# Patient Record
Sex: FEMALE | Race: WHITE | NOT HISPANIC OR LATINO | URBAN - METROPOLITAN AREA
[De-identification: names, ages, dates, MRNs, and addresses within clinical notes are randomized per-mention and may not be internally consistent; named-entity substitution may affect disease eponyms.]

---

## 2021-04-01 ENCOUNTER — INPATIENT (INPATIENT)
Facility: HOSPITAL | Age: 36
LOS: 7 days | Discharge: HOME | End: 2021-04-09
Attending: STUDENT IN AN ORGANIZED HEALTH CARE EDUCATION/TRAINING PROGRAM | Admitting: STUDENT IN AN ORGANIZED HEALTH CARE EDUCATION/TRAINING PROGRAM
Payer: COMMERCIAL

## 2021-04-01 VITALS
RESPIRATION RATE: 20 BRPM | TEMPERATURE: 100 F | SYSTOLIC BLOOD PRESSURE: 129 MMHG | HEART RATE: 135 BPM | OXYGEN SATURATION: 92 % | DIASTOLIC BLOOD PRESSURE: 94 MMHG

## 2021-04-01 LAB
ANION GAP SERPL CALC-SCNC: 19 MMOL/L — HIGH (ref 7–14)
B-OH-BUTYR SERPL-SCNC: 3.2 MMOL/L — HIGH
BASE EXCESS BLDV CALC-SCNC: -5.7 MMOL/L — LOW (ref -2–2)
BASOPHILS # BLD AUTO: 0.01 K/UL — SIGNIFICANT CHANGE UP (ref 0–0.2)
BASOPHILS NFR BLD AUTO: 0.2 % — SIGNIFICANT CHANGE UP (ref 0–1)
BUN SERPL-MCNC: 8 MG/DL — LOW (ref 10–20)
CA-I SERPL-SCNC: SIGNIFICANT CHANGE UP MMOL/L (ref 1.12–1.3)
CALCIUM SERPL-MCNC: 8.8 MG/DL — SIGNIFICANT CHANGE UP (ref 8.5–10.1)
CHLORIDE SERPL-SCNC: 100 MMOL/L — SIGNIFICANT CHANGE UP (ref 98–110)
CO2 SERPL-SCNC: 17 MMOL/L — SIGNIFICANT CHANGE UP (ref 17–32)
CREAT SERPL-MCNC: 0.8 MG/DL — SIGNIFICANT CHANGE UP (ref 0.7–1.5)
D DIMER BLD IA.RAPID-MCNC: 173 NG/ML DDU — SIGNIFICANT CHANGE UP (ref 0–230)
EOSINOPHIL # BLD AUTO: 0 K/UL — SIGNIFICANT CHANGE UP (ref 0–0.7)
EOSINOPHIL NFR BLD AUTO: 0 % — SIGNIFICANT CHANGE UP (ref 0–8)
GAS PNL BLDV: SIGNIFICANT CHANGE UP
GAS PNL BLDV: SIGNIFICANT CHANGE UP
GAS PNL BLDV: SIGNIFICANT CHANGE UP MMOL/L (ref 136–145)
GLUCOSE BLDC GLUCOMTR-MCNC: 257 MG/DL — HIGH (ref 70–99)
GLUCOSE BLDC GLUCOMTR-MCNC: 260 MG/DL — HIGH (ref 70–99)
GLUCOSE SERPL-MCNC: 328 MG/DL — HIGH (ref 70–99)
HCG SERPL QL: NEGATIVE — SIGNIFICANT CHANGE UP
HCO3 BLDV-SCNC: 18 MMOL/L — LOW (ref 22–29)
HCT VFR BLD CALC: 53.3 % — HIGH (ref 37–47)
HCT VFR BLDA CALC: 47 % — HIGH (ref 34–44)
HGB BLD CALC-MCNC: 15.3 G/DL — SIGNIFICANT CHANGE UP (ref 14–18)
HGB BLD-MCNC: 17 G/DL — HIGH (ref 12–16)
IMM GRANULOCYTES NFR BLD AUTO: 0.5 % — HIGH (ref 0.1–0.3)
LACTATE BLDV-MCNC: SIGNIFICANT CHANGE UP MMOL/L (ref 0.5–1.6)
LACTATE SERPL-SCNC: 2.2 MMOL/L — HIGH (ref 0.7–2)
LYMPHOCYTES # BLD AUTO: 0.86 K/UL — LOW (ref 1.2–3.4)
LYMPHOCYTES # BLD AUTO: 13.2 % — LOW (ref 20.5–51.1)
MCHC RBC-ENTMCNC: 24 PG — LOW (ref 27–31)
MCHC RBC-ENTMCNC: 31.9 G/DL — LOW (ref 32–37)
MCV RBC AUTO: 75.4 FL — LOW (ref 81–99)
MONOCYTES # BLD AUTO: 0.33 K/UL — SIGNIFICANT CHANGE UP (ref 0.1–0.6)
MONOCYTES NFR BLD AUTO: 5.1 % — SIGNIFICANT CHANGE UP (ref 1.7–9.3)
NEUTROPHILS # BLD AUTO: 5.3 K/UL — SIGNIFICANT CHANGE UP (ref 1.4–6.5)
NEUTROPHILS NFR BLD AUTO: 81 % — HIGH (ref 42.2–75.2)
NRBC # BLD: 0 /100 WBCS — SIGNIFICANT CHANGE UP (ref 0–0)
PCO2 BLDV: 28 MMHG — LOW (ref 39–42)
PH BLDV: 7.4 — SIGNIFICANT CHANGE UP (ref 7.26–7.43)
PLATELET # BLD AUTO: 241 K/UL — SIGNIFICANT CHANGE UP (ref 130–400)
PO2 BLDV: 40 MMHG — SIGNIFICANT CHANGE UP (ref 20–40)
POTASSIUM BLDV-SCNC: 3.5 MMOL/L — SIGNIFICANT CHANGE UP (ref 3.3–5.6)
POTASSIUM SERPL-MCNC: 4.4 MMOL/L — SIGNIFICANT CHANGE UP (ref 3.5–5)
POTASSIUM SERPL-SCNC: 4.4 MMOL/L — SIGNIFICANT CHANGE UP (ref 3.5–5)
RBC # BLD: 7.07 M/UL — HIGH (ref 4.2–5.4)
RBC # FLD: 14.5 % — SIGNIFICANT CHANGE UP (ref 11.5–14.5)
SAO2 % BLDV: 77 % — SIGNIFICANT CHANGE UP
SARS-COV-2 RNA SPEC QL NAA+PROBE: SIGNIFICANT CHANGE UP
SODIUM SERPL-SCNC: 136 MMOL/L — SIGNIFICANT CHANGE UP (ref 135–146)
WBC # BLD: 6.53 K/UL — SIGNIFICANT CHANGE UP (ref 4.8–10.8)
WBC # FLD AUTO: 6.53 K/UL — SIGNIFICANT CHANGE UP (ref 4.8–10.8)

## 2021-04-01 PROCEDURE — 99291 CRITICAL CARE FIRST HOUR: CPT

## 2021-04-01 PROCEDURE — 71045 X-RAY EXAM CHEST 1 VIEW: CPT | Mod: 26

## 2021-04-01 PROCEDURE — 93010 ELECTROCARDIOGRAM REPORT: CPT

## 2021-04-01 RX ORDER — SODIUM CHLORIDE 9 MG/ML
1000 INJECTION, SOLUTION INTRAVENOUS
Refills: 0 | Status: DISCONTINUED | OUTPATIENT
Start: 2021-04-01 | End: 2021-04-02

## 2021-04-01 RX ORDER — CEFTRIAXONE 500 MG/1
1000 INJECTION, POWDER, FOR SOLUTION INTRAMUSCULAR; INTRAVENOUS EVERY 24 HOURS
Refills: 0 | Status: DISCONTINUED | OUTPATIENT
Start: 2021-04-01 | End: 2021-04-02

## 2021-04-01 RX ORDER — KETOROLAC TROMETHAMINE 30 MG/ML
15 SYRINGE (ML) INJECTION ONCE
Refills: 0 | Status: DISCONTINUED | OUTPATIENT
Start: 2021-04-01 | End: 2021-04-01

## 2021-04-01 RX ORDER — AZITHROMYCIN 500 MG/1
500 TABLET, FILM COATED ORAL EVERY 24 HOURS
Refills: 0 | Status: DISCONTINUED | OUTPATIENT
Start: 2021-04-01 | End: 2021-04-02

## 2021-04-01 RX ORDER — AZITHROMYCIN 500 MG/1
500 TABLET, FILM COATED ORAL ONCE
Refills: 0 | Status: COMPLETED | OUTPATIENT
Start: 2021-04-01 | End: 2021-04-01

## 2021-04-01 RX ORDER — ONDANSETRON 8 MG/1
4 TABLET, FILM COATED ORAL ONCE
Refills: 0 | Status: COMPLETED | OUTPATIENT
Start: 2021-04-01 | End: 2021-04-01

## 2021-04-01 RX ORDER — INSULIN HUMAN 100 [IU]/ML
8 INJECTION, SOLUTION SUBCUTANEOUS
Qty: 100 | Refills: 0 | Status: DISCONTINUED | OUTPATIENT
Start: 2021-04-01 | End: 2021-04-03

## 2021-04-01 RX ORDER — SODIUM CHLORIDE 9 MG/ML
1000 INJECTION, SOLUTION INTRAVENOUS ONCE
Refills: 0 | Status: COMPLETED | OUTPATIENT
Start: 2021-04-01 | End: 2021-04-01

## 2021-04-01 RX ORDER — CEFTRIAXONE 500 MG/1
1000 INJECTION, POWDER, FOR SOLUTION INTRAMUSCULAR; INTRAVENOUS ONCE
Refills: 0 | Status: COMPLETED | OUTPATIENT
Start: 2021-04-01 | End: 2021-04-01

## 2021-04-01 RX ORDER — ACETAMINOPHEN 500 MG
975 TABLET ORAL ONCE
Refills: 0 | Status: COMPLETED | OUTPATIENT
Start: 2021-04-01 | End: 2021-04-01

## 2021-04-01 RX ADMIN — CEFTRIAXONE 100 MILLIGRAM(S): 500 INJECTION, POWDER, FOR SOLUTION INTRAMUSCULAR; INTRAVENOUS at 21:04

## 2021-04-01 RX ADMIN — SODIUM CHLORIDE 1000 MILLILITER(S): 9 INJECTION, SOLUTION INTRAVENOUS at 20:41

## 2021-04-01 RX ADMIN — SODIUM CHLORIDE 1000 MILLILITER(S): 9 INJECTION, SOLUTION INTRAVENOUS at 18:23

## 2021-04-01 RX ADMIN — ONDANSETRON 4 MILLIGRAM(S): 8 TABLET, FILM COATED ORAL at 20:41

## 2021-04-01 RX ADMIN — Medication 15 MILLIGRAM(S): at 19:24

## 2021-04-01 RX ADMIN — INSULIN HUMAN 8 UNIT(S)/HR: 100 INJECTION, SOLUTION SUBCUTANEOUS at 22:29

## 2021-04-01 RX ADMIN — AZITHROMYCIN 255 MILLIGRAM(S): 500 TABLET, FILM COATED ORAL at 21:04

## 2021-04-01 NOTE — ED PROVIDER NOTE - OBJECTIVE STATEMENT
pt with no pmhx presents to ED c/o cough and fever for 5 days. UC today neg rapid covid19 swab. Denies HA/dizziness/chest pain/palpitation/sob/abd pain/n/v/d/ black stool/bloody stool/urinary sxs

## 2021-04-01 NOTE — H&P ADULT - HISTORY OF PRESENT ILLNESS
34 y/o F with no PMH now presenting to ED with 4 day h/o SOB and cough. Pt went to urgent care to get rapid swab for COVID which came back negative. Now presented to ED with worsening symptoms. In the ED pt was found to have DKA along with possible PNA on Xray. Pt was given IV insulin along with antibiotics and being admitted to MICU. 36 y/o F with no PMH now presenting to ED with 4 day h/o SOB and cough. Pt went to urgent care to get rapid swab for COVID which came back negative. Now presented to ED with worsening symptoms. In the ED pt was found to have DKA along with PNA on Xray. Pt was given IV insulin along with antibiotics and called to MICU

## 2021-04-01 NOTE — ED PROVIDER NOTE - ATTENDING CONTRIBUTION TO CARE
weakness, generalized with cough for 3-4 days that is non productive, moderate. gradual. exam shows low pulse ox, no leg swelling, rhonchi in both lung fields, tachycardia. plan is to obtain labs, cxr, and provide IVF for hydration.

## 2021-04-01 NOTE — H&P ADULT - NSHPLABSRESULTS_GEN_ALL_CORE
CBC Full  -  ( 01 Apr 2021 18:39 )  WBC Count : 6.53 K/uL  RBC Count : 7.07 M/uL  Hemoglobin : 17.0 g/dL  Hematocrit : 53.3 %  Platelet Count - Automated : 241 K/uL  Mean Cell Volume : 75.4 fL  Mean Cell Hemoglobin : 24.0 pg  Mean Cell Hemoglobin Concentration : 31.9 g/dL  Auto Neutrophil # : 5.30 K/uL  Auto Lymphocyte # : 0.86 K/uL  Auto Monocyte # : 0.33 K/uL  Auto Eosinophil # : 0.00 K/uL  Auto Basophil # : 0.01 K/uL  Auto Neutrophil % : 81.0 %  Auto Lymphocyte % : 13.2 %  Auto Monocyte % : 5.1 %  Auto Eosinophil % : 0.0 %  Auto Basophil % : 0.2 %    04-01    136  |  100  |  8<L>  ----------------------------<  328<H>  4.4   |  17  |  0.8    Ca    8.8      01 Apr 2021 18:39

## 2021-04-01 NOTE — ED PROVIDER NOTE - NS ED ROS FT
Constitutional: no recent weight loss, change in appetite   Eyes: no redness/discharge/pain/vision changes  ENT: no rhinorrhea/ear pain/sore throat  Cardiac: No chest pain, SOB or edema.  Respiratory: No respiratory distress  GI: No vomiting, diarrhea or abdominal pain.  : No dysuria, frequency, urgency or hematuria  MS: no pain to back or extremities, no loss of ROM, no weakness  Neuro: No headache or weakness. No LOC.  Skin: No skin rash.  Endocrine: No history of thyroid disease or diabetes.  Except as documented in the HPI, all other systems are negative.

## 2021-04-01 NOTE — H&P ADULT - ASSESSMENT
Impression   - DKA  - suspected CAP     Plan     1.CNS no excessive sedation    2. CVS I= O    3. PULMONARY HOB 45    4. INFECTIOUS DISEASE panculture , ceftriaxone and azithro for now, f/u procal    5. GI NPO    6. RENAL and acid base f/u lytes and correct    7. Endocrine FS DKA protocol    8. Hematology f/u cbc    9. DVT AND GI PROPHYLAXIS    10. Dispo    11. Code Status MICU     Impression     - DKA  - Acute hypoxemic resp failure/  CAP  - covid neg      Plan     1.CNS: avoid CNS depressant    2. CVS I= O, CE, echo, IVF    3. PULMONARY HOB 45, keep Sao2 88 to 94%, chest ct angio    4. INFECTIOUS DISEASE panculture , ceftriaxone and levaquin,  for now, f/u procal swab nose for MRSA. F.UP CX    5. GI prophylaxis, po    6. RENAL and acid base f/u lytes and correct    7. Endocrine FS DKA protocol, insulin per pro    8. Hematology f/u cbc    9. DVT    MICU

## 2021-04-01 NOTE — ED PROVIDER NOTE - CLINICAL SUMMARY MEDICAL DECISION MAKING FREE TEXT BOX
No patient evaluated for fever and hypoxia, found to have pneumonia, started on iv abx. covid negative, patient found to have dka with elevated hydroxbutyrate and anion gap. we will treat with insulin drip and admit to icu.

## 2021-04-01 NOTE — H&P ADULT - NSICDXNOFAMILYHX_GEN_ALL_CORE
shows cardiomegaly some pulmonary congestion we admitted her to the hospital for observation given swelling at this time I do not think she has anaphylaxis otherwise stable she is protecting her airway, admitted. 12 lead EKG per my interpretation:  Junctional 63  Axis is   Left axis deviation  QTc is  403  There is no specific T wave changes appreciated. There is no specific ST wave changes appreciated. Prior EKG to compare with was not available       All diagnostic, treatment, and disposition decisions were made by myself in conjunction with the Advanced Practice Provider. For all further details of the patient's emergency department visit, please see the Advanced Practice Provider's documentation.         Gabo Denton DO  06/08/20 [id number] <-- Click to add NO pertinent Family History

## 2021-04-01 NOTE — ED PROVIDER NOTE - PHYSICAL EXAMINATION
CONSTITUTIONAL: Well-appearing; well-nourished; in no apparent distress.   CARDIOVASCULAR: Normal S1, S2; no murmurs, rubs, or gallops.   RESPIRATORY: Normal chest excursion with respiration; breath sounds clear and equal bilaterally; no wheezes, rhonchi, or rales.  GI/: Normal bowel sounds; non-distended; non-tender; no palpable organomegaly.   SKIN: Normal for age and race; warm; dry; good turgor; no apparent lesions or exudate.   NEURO/PSYCH: A & O x 4; grossly unremarkable. mood and manner are appropriate.

## 2021-04-02 LAB
A1C WITH ESTIMATED AVERAGE GLUCOSE RESULT: 11 % — HIGH (ref 4–5.6)
ALBUMIN SERPL ELPH-MCNC: 3.2 G/DL — LOW (ref 3.5–5.2)
ALBUMIN SERPL ELPH-MCNC: 3.3 G/DL — LOW (ref 3.5–5.2)
ALP SERPL-CCNC: 159 U/L — HIGH (ref 30–115)
ALP SERPL-CCNC: 161 U/L — HIGH (ref 30–115)
ALT FLD-CCNC: 15 U/L — SIGNIFICANT CHANGE UP (ref 0–41)
ALT FLD-CCNC: 15 U/L — SIGNIFICANT CHANGE UP (ref 0–41)
ANION GAP SERPL CALC-SCNC: 11 MMOL/L — SIGNIFICANT CHANGE UP (ref 7–14)
ANION GAP SERPL CALC-SCNC: 12 MMOL/L — SIGNIFICANT CHANGE UP (ref 7–14)
ANION GAP SERPL CALC-SCNC: 14 MMOL/L — SIGNIFICANT CHANGE UP (ref 7–14)
ANION GAP SERPL CALC-SCNC: 16 MMOL/L — HIGH (ref 7–14)
AST SERPL-CCNC: 21 U/L — SIGNIFICANT CHANGE UP (ref 0–41)
AST SERPL-CCNC: 55 U/L — HIGH (ref 0–41)
BILIRUB SERPL-MCNC: 0.3 MG/DL — SIGNIFICANT CHANGE UP (ref 0.2–1.2)
BILIRUB SERPL-MCNC: 0.3 MG/DL — SIGNIFICANT CHANGE UP (ref 0.2–1.2)
BUN SERPL-MCNC: 10 MG/DL — SIGNIFICANT CHANGE UP (ref 10–20)
BUN SERPL-MCNC: 10 MG/DL — SIGNIFICANT CHANGE UP (ref 10–20)
BUN SERPL-MCNC: 7 MG/DL — LOW (ref 10–20)
BUN SERPL-MCNC: 8 MG/DL — LOW (ref 10–20)
CALCIUM SERPL-MCNC: 8 MG/DL — LOW (ref 8.5–10.1)
CALCIUM SERPL-MCNC: 8 MG/DL — LOW (ref 8.5–10.1)
CALCIUM SERPL-MCNC: 8.1 MG/DL — LOW (ref 8.5–10.1)
CALCIUM SERPL-MCNC: 8.3 MG/DL — LOW (ref 8.5–10.1)
CHLORIDE SERPL-SCNC: 103 MMOL/L — SIGNIFICANT CHANGE UP (ref 98–110)
CHLORIDE SERPL-SCNC: 103 MMOL/L — SIGNIFICANT CHANGE UP (ref 98–110)
CHLORIDE SERPL-SCNC: 104 MMOL/L — SIGNIFICANT CHANGE UP (ref 98–110)
CHLORIDE SERPL-SCNC: 105 MMOL/L — SIGNIFICANT CHANGE UP (ref 98–110)
CO2 SERPL-SCNC: 17 MMOL/L — SIGNIFICANT CHANGE UP (ref 17–32)
CO2 SERPL-SCNC: 20 MMOL/L — SIGNIFICANT CHANGE UP (ref 17–32)
COVID-19 SPIKE DOMAIN AB INTERP: POSITIVE
COVID-19 SPIKE DOMAIN ANTIBODY RESULT: 30.6 U/ML — HIGH
CREAT SERPL-MCNC: 0.6 MG/DL — LOW (ref 0.7–1.5)
CREAT SERPL-MCNC: 0.7 MG/DL — SIGNIFICANT CHANGE UP (ref 0.7–1.5)
ESTIMATED AVERAGE GLUCOSE: 269 MG/DL — HIGH (ref 68–114)
GLUCOSE BLDC GLUCOMTR-MCNC: 139 MG/DL — HIGH (ref 70–99)
GLUCOSE BLDC GLUCOMTR-MCNC: 161 MG/DL — HIGH (ref 70–99)
GLUCOSE BLDC GLUCOMTR-MCNC: 177 MG/DL — HIGH (ref 70–99)
GLUCOSE BLDC GLUCOMTR-MCNC: 190 MG/DL — HIGH (ref 70–99)
GLUCOSE BLDC GLUCOMTR-MCNC: 195 MG/DL — HIGH (ref 70–99)
GLUCOSE BLDC GLUCOMTR-MCNC: 199 MG/DL — HIGH (ref 70–99)
GLUCOSE BLDC GLUCOMTR-MCNC: 203 MG/DL — HIGH (ref 70–99)
GLUCOSE BLDC GLUCOMTR-MCNC: 206 MG/DL — HIGH (ref 70–99)
GLUCOSE BLDC GLUCOMTR-MCNC: 210 MG/DL — HIGH (ref 70–99)
GLUCOSE BLDC GLUCOMTR-MCNC: 215 MG/DL — HIGH (ref 70–99)
GLUCOSE BLDC GLUCOMTR-MCNC: 220 MG/DL — HIGH (ref 70–99)
GLUCOSE BLDC GLUCOMTR-MCNC: 225 MG/DL — HIGH (ref 70–99)
GLUCOSE BLDC GLUCOMTR-MCNC: 235 MG/DL — HIGH (ref 70–99)
GLUCOSE BLDC GLUCOMTR-MCNC: 236 MG/DL — HIGH (ref 70–99)
GLUCOSE BLDC GLUCOMTR-MCNC: 253 MG/DL — HIGH (ref 70–99)
GLUCOSE SERPL-MCNC: 217 MG/DL — HIGH (ref 70–99)
GLUCOSE SERPL-MCNC: 226 MG/DL — HIGH (ref 70–99)
GLUCOSE SERPL-MCNC: 253 MG/DL — HIGH (ref 70–99)
GLUCOSE SERPL-MCNC: 275 MG/DL — HIGH (ref 70–99)
HCT VFR BLD CALC: 43.8 % — SIGNIFICANT CHANGE UP (ref 37–47)
HGB BLD-MCNC: 14 G/DL — SIGNIFICANT CHANGE UP (ref 12–16)
MCHC RBC-ENTMCNC: 23.6 PG — LOW (ref 27–31)
MCHC RBC-ENTMCNC: 32 G/DL — SIGNIFICANT CHANGE UP (ref 32–37)
MCV RBC AUTO: 74 FL — LOW (ref 81–99)
NRBC # BLD: 0 /100 WBCS — SIGNIFICANT CHANGE UP (ref 0–0)
PLATELET # BLD AUTO: 191 K/UL — SIGNIFICANT CHANGE UP (ref 130–400)
POTASSIUM SERPL-MCNC: 3.4 MMOL/L — LOW (ref 3.5–5)
POTASSIUM SERPL-MCNC: 3.4 MMOL/L — LOW (ref 3.5–5)
POTASSIUM SERPL-MCNC: 3.5 MMOL/L — SIGNIFICANT CHANGE UP (ref 3.5–5)
POTASSIUM SERPL-MCNC: 5.2 MMOL/L — HIGH (ref 3.5–5)
POTASSIUM SERPL-SCNC: 3.4 MMOL/L — LOW (ref 3.5–5)
POTASSIUM SERPL-SCNC: 3.4 MMOL/L — LOW (ref 3.5–5)
POTASSIUM SERPL-SCNC: 3.5 MMOL/L — SIGNIFICANT CHANGE UP (ref 3.5–5)
POTASSIUM SERPL-SCNC: 5.2 MMOL/L — HIGH (ref 3.5–5)
PROCALCITONIN SERPL-MCNC: 0.09 NG/ML — SIGNIFICANT CHANGE UP (ref 0.02–0.1)
PROT SERPL-MCNC: 6.4 G/DL — SIGNIFICANT CHANGE UP (ref 6–8)
PROT SERPL-MCNC: 7 G/DL — SIGNIFICANT CHANGE UP (ref 6–8)
RBC # BLD: 5.92 M/UL — HIGH (ref 4.2–5.4)
RBC # FLD: 13.6 % — SIGNIFICANT CHANGE UP (ref 11.5–14.5)
SARS-COV-2 IGG+IGM SERPL QL IA: 30.6 U/ML — HIGH
SARS-COV-2 IGG+IGM SERPL QL IA: POSITIVE
SODIUM SERPL-SCNC: 135 MMOL/L — SIGNIFICANT CHANGE UP (ref 135–146)
SODIUM SERPL-SCNC: 136 MMOL/L — SIGNIFICANT CHANGE UP (ref 135–146)
SODIUM SERPL-SCNC: 137 MMOL/L — SIGNIFICANT CHANGE UP (ref 135–146)
SODIUM SERPL-SCNC: 137 MMOL/L — SIGNIFICANT CHANGE UP (ref 135–146)
TROPONIN T SERPL-MCNC: <0.01 NG/ML — SIGNIFICANT CHANGE UP
WBC # BLD: 6.81 K/UL — SIGNIFICANT CHANGE UP (ref 4.8–10.8)
WBC # FLD AUTO: 6.81 K/UL — SIGNIFICANT CHANGE UP (ref 4.8–10.8)

## 2021-04-02 PROCEDURE — 99222 1ST HOSP IP/OBS MODERATE 55: CPT

## 2021-04-02 PROCEDURE — 99232 SBSQ HOSP IP/OBS MODERATE 35: CPT

## 2021-04-02 PROCEDURE — 71275 CT ANGIOGRAPHY CHEST: CPT | Mod: 26

## 2021-04-02 PROCEDURE — 99221 1ST HOSP IP/OBS SF/LOW 40: CPT

## 2021-04-02 PROCEDURE — 71045 X-RAY EXAM CHEST 1 VIEW: CPT | Mod: 26

## 2021-04-02 RX ORDER — ONDANSETRON 8 MG/1
4 TABLET, FILM COATED ORAL EVERY 8 HOURS
Refills: 0 | Status: DISCONTINUED | OUTPATIENT
Start: 2021-04-02 | End: 2021-04-09

## 2021-04-02 RX ORDER — INSULIN LISPRO 100/ML
VIAL (ML) SUBCUTANEOUS
Refills: 0 | Status: DISCONTINUED | OUTPATIENT
Start: 2021-04-02 | End: 2021-04-03

## 2021-04-02 RX ORDER — DEXTROSE 50 % IN WATER 50 %
12.5 SYRINGE (ML) INTRAVENOUS ONCE
Refills: 0 | Status: DISCONTINUED | OUTPATIENT
Start: 2021-04-02 | End: 2021-04-09

## 2021-04-02 RX ORDER — ENOXAPARIN SODIUM 100 MG/ML
40 INJECTION SUBCUTANEOUS DAILY
Refills: 0 | Status: DISCONTINUED | OUTPATIENT
Start: 2021-04-02 | End: 2021-04-03

## 2021-04-02 RX ORDER — POTASSIUM CHLORIDE 20 MEQ
10 PACKET (EA) ORAL
Refills: 0 | Status: DISCONTINUED | OUTPATIENT
Start: 2021-04-02 | End: 2021-04-02

## 2021-04-02 RX ORDER — DEXTROSE 50 % IN WATER 50 %
25 SYRINGE (ML) INTRAVENOUS ONCE
Refills: 0 | Status: DISCONTINUED | OUTPATIENT
Start: 2021-04-02 | End: 2021-04-09

## 2021-04-02 RX ORDER — KETOROLAC TROMETHAMINE 30 MG/ML
15 SYRINGE (ML) INJECTION ONCE
Refills: 0 | Status: DISCONTINUED | OUTPATIENT
Start: 2021-04-02 | End: 2021-04-02

## 2021-04-02 RX ORDER — INSULIN GLARGINE 100 [IU]/ML
15 INJECTION, SOLUTION SUBCUTANEOUS AT BEDTIME
Refills: 0 | Status: DISCONTINUED | OUTPATIENT
Start: 2021-04-02 | End: 2021-04-03

## 2021-04-02 RX ORDER — DEXTROSE 50 % IN WATER 50 %
15 SYRINGE (ML) INTRAVENOUS ONCE
Refills: 0 | Status: DISCONTINUED | OUTPATIENT
Start: 2021-04-02 | End: 2021-04-09

## 2021-04-02 RX ORDER — SODIUM CHLORIDE 9 MG/ML
1000 INJECTION, SOLUTION INTRAVENOUS
Refills: 0 | Status: DISCONTINUED | OUTPATIENT
Start: 2021-04-02 | End: 2021-04-09

## 2021-04-02 RX ORDER — SODIUM CHLORIDE 9 MG/ML
1000 INJECTION, SOLUTION INTRAVENOUS
Refills: 0 | Status: DISCONTINUED | OUTPATIENT
Start: 2021-04-02 | End: 2021-04-03

## 2021-04-02 RX ORDER — METOCLOPRAMIDE HCL 10 MG
10 TABLET ORAL ONCE
Refills: 0 | Status: COMPLETED | OUTPATIENT
Start: 2021-04-02 | End: 2021-04-02

## 2021-04-02 RX ORDER — POTASSIUM CHLORIDE 20 MEQ
20 PACKET (EA) ORAL ONCE
Refills: 0 | Status: COMPLETED | OUTPATIENT
Start: 2021-04-02 | End: 2021-04-02

## 2021-04-02 RX ORDER — INSULIN LISPRO 100/ML
5 VIAL (ML) SUBCUTANEOUS
Refills: 0 | Status: DISCONTINUED | OUTPATIENT
Start: 2021-04-02 | End: 2021-04-03

## 2021-04-02 RX ORDER — SODIUM CHLORIDE 9 MG/ML
1000 INJECTION, SOLUTION INTRAVENOUS
Refills: 0 | Status: DISCONTINUED | OUTPATIENT
Start: 2021-04-02 | End: 2021-04-02

## 2021-04-02 RX ORDER — GLUCAGON INJECTION, SOLUTION 0.5 MG/.1ML
1 INJECTION, SOLUTION SUBCUTANEOUS ONCE
Refills: 0 | Status: DISCONTINUED | OUTPATIENT
Start: 2021-04-02 | End: 2021-04-09

## 2021-04-02 RX ADMIN — SODIUM CHLORIDE 100 MILLILITER(S): 9 INJECTION, SOLUTION INTRAVENOUS at 23:21

## 2021-04-02 RX ADMIN — Medication 50 MILLIEQUIVALENT(S): at 14:13

## 2021-04-02 RX ADMIN — SODIUM CHLORIDE 150 MILLILITER(S): 9 INJECTION, SOLUTION INTRAVENOUS at 00:17

## 2021-04-02 RX ADMIN — ENOXAPARIN SODIUM 40 MILLIGRAM(S): 100 INJECTION SUBCUTANEOUS at 12:14

## 2021-04-02 RX ADMIN — CEFTRIAXONE 100 MILLIGRAM(S): 500 INJECTION, POWDER, FOR SOLUTION INTRAMUSCULAR; INTRAVENOUS at 12:05

## 2021-04-02 RX ADMIN — INSULIN GLARGINE 15 UNIT(S): 100 INJECTION, SOLUTION SUBCUTANEOUS at 23:21

## 2021-04-02 RX ADMIN — ONDANSETRON 4 MILLIGRAM(S): 8 TABLET, FILM COATED ORAL at 03:08

## 2021-04-02 RX ADMIN — Medication 15 MILLIGRAM(S): at 00:16

## 2021-04-02 RX ADMIN — Medication 15 MILLIGRAM(S): at 03:51

## 2021-04-02 RX ADMIN — SODIUM CHLORIDE 100 MILLILITER(S): 9 INJECTION, SOLUTION INTRAVENOUS at 03:51

## 2021-04-02 RX ADMIN — AZITHROMYCIN 255 MILLIGRAM(S): 500 TABLET, FILM COATED ORAL at 12:15

## 2021-04-02 RX ADMIN — Medication 10 MILLIGRAM(S): at 08:08

## 2021-04-02 NOTE — CONSULT NOTE ADULT - ASSESSMENT
Assessment:  RBBB likely 2/2 pulmonary etiology  Multifocal pneumonia, atelectatic bilateral lungs   DKA, resolving  No sign of fluid overload    Plan:  c/w IV fluid management for DKA as needed   ICU management of DKA and multifocal pneumonia  2D echo

## 2021-04-02 NOTE — CONSULT NOTE ADULT - ATTENDING COMMENTS
Seen / examined and above reviewed.    No cardiac history.    Hospitalized with mild DKA and multifocal PNA.    No chest pain dyspnea.  Hemodynamics stable.  EKG: NSR, EKG    - DKA / PNA treatement per ICU team.  - ECHO  - Ischemic evaluation after clinical recovery given risk factors.
38 yr old woman presented with mild DKA, new onset DM and Pneumonia. Cont with DKA protocol, replete potassium aggressively and when insulin requirements reduce and pt is able to eat, switch to basal bolus, with a 3-4 hr overlap between insulin drip and glargine. PE was significant for large pink abdominal striae- to pursue cushings work up as out.pt ( cortisol can be elevated during stress not appropriate to perform testing at this time) Agree with rest of recommendations as outlined in residents note.

## 2021-04-02 NOTE — CONSULT NOTE ADULT - ASSESSMENT
#Assessment  Hyperglycemia  ? Starvation ketosis  Doubt Diabetic Ketoacidosis  Family Hx of DM2    #Recommendations  Check HBa1c    ATTENDING ATTESTATION TO FOLLOW     #Assessment  Hyperglycemia  ?Starvation ketosis  Doubt Diabetic Ketoacidosis  Eyelid xanthomas   Family Hx of DM2    #Recommendations  Check HBa1c, Lipid profile    ATTENDING ATTESTATION TO FOLLOW     #Assessment  Diabetic Ketoacidosis  Newly diagnosed Diabetes, HBa1c 11.0   Eyelid xanthomas   Abdominal striae  Family Hx of DM2    #Recommendations  -Check Lipid profile, TSH, CAMDEN antibodies, C-peptide  -Once able to tolerate PO, switch to basal-bolus insulin, at 0.5 units/kg per day, half the regimen as Lantus, half as Lispro divided into 3 pre-meal doses  -On discharge start Metformin 100mg BID, Pioglitazone 30mg qd, and continue Glargine dose that patient's sugars are stable on  -Workup for Cushing's as outpatient, since inpatient random Cortisol levels unlikely to be accurate  -On discharge set up appointment for [facility] endocrinology clinic at [phone]     #Assessment  Diabetic Ketoacidosis  Newly diagnosed Diabetes, HBa1c 11.0   Eyelid xanthomas   Abdominal striae  Family Hx of DM2    #Recommendations  -Check Lipid profile, TSH, CAMDEN antibodies, C-peptide  - If C- peptide is low, will need a recheck as out.pt, as it is not completely reliable during glucose toxic phase and also pt is on insulin drip, but if it is normal or high it indicates pt is not insulin deficit(Type 1)  -Cont with DKA protocol, aggressively replete potassium to level > 4, Once able to tolerate PO, switch to basal-bolus insulin, at 0.5 units/kg per day, half the regimen as Lantus, half as Lispro divided into 3 pre-meal doses  -On discharge start Metformin 1000 mg BID, Pioglitazone 30mg qd, and continue Glargine dose that patient's sugars are stable on  -Workup for Cushing's as outpatient, since inpatient random Cortisol levels unlikely to be accurate and PE significant for large abdominal striae.   -On discharge set up appointment for [facility] endocrinology clinic at [phone]

## 2021-04-02 NOTE — CONSULT NOTE ADULT - SUBJECTIVE AND OBJECTIVE BOX
Date of Admission: 04-01-21    CHIEF COMPLAINT: Patient is a 35y old  Female who presents with a chief complaint of DKA (01 Apr 2021 21:52)      HPI:  34 y/o F with no PMH now presenting to ED with 4 day h/o SOB and cough. Pt went to urgent care to get rapid swab for COVID which came back negative. Now presented to ED with worsening symptoms. In the ED pt was found to have DKA along with possible PNA on Xray. Pt was given IV insulin along with antibiotics and being admitted to MICU. (01 Apr 2021 21:52)      PAST MEDICAL & SURGICAL HISTORY:  Migraines  No significant past surgical history    FAMILY HISTORY:  [x] no pertinent family history of premature cardiovascular disease in first degree relatives.    SOCIAL HISTORY:    [  ] Non-smoker  [  ] Ex-Smoker  [x] No alcohol use  [x] No illicit drug use    Allergies:  No Known Allergies    REVIEW OF SYSTEMS:  CONSTITUTIONAL: No fever, weight loss, or fatigue  CARDIOLOGY: No chest pain, dyspnea of exertion, or syncopal episodes.   RESPIRATORY: (+) Shortness of breath, cough. No wheezing.   NEUROLOGICAL: No weakness, no focal deficits to report.  GI: No BRBPR, no N,V,diarrhea.    PSYCHIATRY: Normal mood and affect.  HEENT: No nasal discharge, no ecchymosis  SKIN: No ecchymosis, no breakdown  MUSCULOSKELETAL: Full range of motion x4.   EXTREM: No leg swelling or erythema.    PHYSICAL EXAM:  T(C): 37.4 (04-02-21 @ 07:42), Max: 38.3 (04-01-21 @ 19:32)  HR: 112 (04-02-21 @ 07:42) (110 - 135)  BP: 120/70 (04-02-21 @ 07:42) (110/72 - 129/94)  RR: 18 (04-02-21 @ 07:42) (18 - 20)  SpO2: 95% (04-02-21 @ 07:42) (92% - 98%)  Wt(kg): --  I&O's Summary      General Appearance: NAD, normal for age and gender. 	  Neck: Normal JVP, no bruit.   Eyes: No xanthomalasia, Extra Ocular muscles intact.   Cardiovascular: Regular rate and rhythm S1 S2, No JVD, No murmurs.  Respiratory: Lungs clear to auscultation. No wheezes, rales or rhonchi.  Psychiatry: Alert and oriented x 3, Mood & affect appropriate  Gastrointestinal:  Soft, Non-tender  Skin/Integumen: No rashes, No ecchymoses, No cyanosis	  Neurologic: Non-focal deficits.  Musculoskeletal/ extremities: Normal range of motion, No clubbing, cyanosis or edema  Vascular: Peripheral pulses palpable bilaterally    LABS:	 	                        14.0   6.81  )-----------( 191      ( 02 Apr 2021 06:26 )             43.8     04-02    136  |  103  |  10  ----------------------------<  275<H>  3.4<L>   |  17  |  0.6<L>    Ca    8.1<L>      02 Apr 2021 00:09    TELEMETRY EVENTS: 	    ECG: < from: 12 Lead ECG (04.01.21 @ 19:57) >  Diagnosis Line Sinus tachycardia  Left axis deviation  Right bundle branch block  Possible Lateral infarct , age undetermined  Abnormal ECG    < end of copied text >  < from: 12 Lead ECG (04.01.21 @ 19:57) >  Diagnosis Line Sinus tachycardia  Left axis deviation  Right bundle branch block  Possible Lateral infarct , age undetermined  Abnormal ECG    RADIOLOGY:   OTHER: 	    PREVIOUS DIAGNOSTIC TESTING:    [ ] Echocardiogram:   [ ] Catheterization:  [ ] Stress Test:  	  	  Home Medications:    MEDICATIONS  (STANDING):  azithromycin  IVPB 500 milliGRAM(s) IV Intermittent every 24 hours  cefTRIAXone   IVPB 1000 milliGRAM(s) IV Intermittent every 24 hours  dextrose 5% + sodium chloride 0.45% 1000 milliLiter(s) (100 mL/Hr) IV Continuous <Continuous>  enoxaparin Injectable 40 milliGRAM(s) SubCutaneous daily  insulin regular Infusion 8 Unit(s)/Hr (8 mL/Hr) IV Continuous <Continuous>    MEDICATIONS  (PRN):  ondansetron Injectable 4 milliGRAM(s) IV Push every 8 hours PRN Nausea and/or Vomiting         Date of Admission: 04-01-21    CHIEF COMPLAINT: Patient is a 35y old  Female who presents with a chief complaint of DKA (01 Apr 2021 21:52)      HPI:  34 y/o F with no PMH now presenting to ED with 4 day h/o SOB and cough. Pt went to urgent care to get rapid swab for COVID which came back negative. Now presented to ED with worsening symptoms. In the ED pt was found to have DKA along with possible PNA on Xray. Pt was given IV insulin along with antibiotics and being admitted to MICU. (01 Apr 2021 21:52)      PAST MEDICAL & SURGICAL HISTORY:  Migraines  No significant past surgical history    FAMILY HISTORY:  [x] no pertinent family history of premature cardiovascular disease in first degree relatives.  [x] DM runs in family    SOCIAL HISTORY:    [x] Non-smoker  [x] No alcohol use  [x] No illicit drug use    Allergies:  No Known Allergies    REVIEW OF SYSTEMS:  CONSTITUTIONAL: No fever, weight loss, or fatigue  CARDIOLOGY: No chest pain, dyspnea of exertion, or syncopal episodes.   RESPIRATORY: (+) Shortness of breath, cough. No wheezing.   NEUROLOGICAL: No weakness, no focal deficits to report.  GI: No BRBPR, no N,V,diarrhea.    PSYCHIATRY: Normal mood and affect.  HEENT: No nasal discharge, no ecchymosis  SKIN: No ecchymosis, no breakdown  MUSCULOSKELETAL: Full range of motion x4.   EXTREM: No leg swelling or erythema.    PHYSICAL EXAM:  T(C): 37.4 (04-02-21 @ 07:42), Max: 38.3 (04-01-21 @ 19:32)  HR: 112 (04-02-21 @ 07:42) (110 - 135)  BP: 120/70 (04-02-21 @ 07:42) (110/72 - 129/94)  RR: 18 (04-02-21 @ 07:42) (18 - 20)  SpO2: 95% (04-02-21 @ 07:42) (92% - 98%)  Wt(kg): --  I&O's Summary      General Appearance: NAD, normal for age and gender. 	  Neck: Normal JVP, no bruit.   Eyes: No xanthomalasia, Extra Ocular muscles intact.   Cardiovascular: Tachycardic. Regular rhythm S1 S2, No JVD, No murmurs.  Respiratory: Absent air entry at bases. Decreased air entry upper lung field.  Psychiatry: Alert and oriented x 3, Mood & affect appropriate  Gastrointestinal:  Soft, Non-tender, Non-distended.  Skin/Integumen: No rashes, No ecchymoses, No cyanosis	  Neurologic: Non-focal deficits.  Musculoskeletal/ extremities: Normal range of motion, No clubbing, cyanosis or edema  Vascular: Peripheral pulses palpable bilaterally    LABS:	 	                        14.0   6.81  )-----------( 191      ( 02 Apr 2021 06:26 )             43.8     04-02    136  |  103  |  10  ----------------------------<  275<H>  3.4<L>   |  17  |  0.6<L>    Ca    8.1<L>      02 Apr 2021 00:09    TELEMETRY EVENTS: 	    ECG: < from: 12 Lead ECG (04.01.21 @ 19:57) >  Diagnosis Line Sinus tachycardia  Left axis deviation  Right bundle branch block  Possible Lateral infarct , age undetermined  Abnormal ECG    < end of copied text >  < from: 12 Lead ECG (04.01.21 @ 19:57) >  Diagnosis Line Sinus tachycardia  Left axis deviation  Right bundle branch block  Possible Lateral infarct , age undetermined  Abnormal ECG    RADIOLOGY: < from: CT Angio Chest PE Protocol w/ IV Cont (04.02.21 @ 11:46) >  IMPRESSION:  1. No central pulmonary embolism.  2. Patchy bilateral consolidative opacities, likely due to multifocal pneumonia. Follow-up to resolution is recommended.  3. Mild mediastinal lymphadenopathy.    < from: Xray Chest 1 View- PORTABLE-Routine (Xray Chest 1 View- PORTABLE-Routine in AM.) (04.02.21 @ 06:24) >  Right greater than left bilateral opacities.      OTHER: 	    PREVIOUS DIAGNOSTIC TESTING:    [ ] Echocardiogram:   [ ] Catheterization:  [ ] Stress Test:  	  	  Home Medications:    MEDICATIONS  (STANDING):  azithromycin  IVPB 500 milliGRAM(s) IV Intermittent every 24 hours  cefTRIAXone   IVPB 1000 milliGRAM(s) IV Intermittent every 24 hours  dextrose 5% + sodium chloride 0.45% 1000 milliLiter(s) (100 mL/Hr) IV Continuous <Continuous>  enoxaparin Injectable 40 milliGRAM(s) SubCutaneous daily  insulin regular Infusion 8 Unit(s)/Hr (8 mL/Hr) IV Continuous <Continuous>    MEDICATIONS  (PRN):  ondansetron Injectable 4 milliGRAM(s) IV Push every 8 hours PRN Nausea and/or Vomiting

## 2021-04-02 NOTE — CONSULT NOTE ADULT - SUBJECTIVE AND OBJECTIVE BOX
Patient is a 35y old  Female who presents with a chief complaint of DKA (02 Apr 2021 08:35)    HPI:  34 y/o F with no PMH now presenting to ED with 4 day h/o SOB and cough. Pt went to urgent care to get rapid swab for COVID which came back negative. Now presented to ED with worsening symptoms. In the ED pt was found to have DKA along with possible PNA on Xray. Pt was given IV insulin along with antibiotics and being admitted to MICU. (01 Apr 2021 21:52)      FAMILY HISTORY:  DM2 in father  No family Hx of DL, Heart disease      PAST MEDICAL & SURGICAL HISTORY:  Migraines    No significant past surgical history      Birth History:  Developmental History:    Review of Systems:  All review of systems negative, except for those marked:  General:		[x] Abnormal: Lethargy  Pulmonary:		[x] Abnormal: Cough  Cardiac:		[] Abnormal:  Gastrointestinal:	[] Abnormal:  ENT:			[] Abnormal:  Renal/Urologic:		[] Abnormal:  Musculoskeletal:	[] Abnormal:  Endocrine:		[] Abnormal:  Hematologic:		[] Abnormal:  Neurologic:		[] Abnormal:  Skin:			[] Abnormal:  Allergy/Immune:	[] Abnormal:  Psychiatric:		[] Abnormal:    Allergies    No Known Allergies    Intolerances      MEDICATIONS  (STANDING):  azithromycin  IVPB 500 milliGRAM(s) IV Intermittent every 24 hours  cefTRIAXone   IVPB 1000 milliGRAM(s) IV Intermittent every 24 hours  dextrose 5% + sodium chloride 0.45% 1000 milliLiter(s) (100 mL/Hr) IV Continuous <Continuous>  enoxaparin Injectable 40 milliGRAM(s) SubCutaneous daily  insulin regular Infusion 8 Unit(s)/Hr (8 mL/Hr) IV Continuous <Continuous>    MEDICATIONS  (PRN):  ondansetron Injectable 4 milliGRAM(s) IV Push every 8 hours PRN Nausea and/or Vomiting      Vital Signs Last 24 Hrs  T(C): 37.4 (02 Apr 2021 07:42), Max: 38.3 (01 Apr 2021 19:32)  T(F): 99.4 (02 Apr 2021 07:42), Max: 100.9 (01 Apr 2021 19:32)  HR: 112 (02 Apr 2021 07:42) (110 - 135)  BP: 120/70 (02 Apr 2021 07:42) (110/72 - 129/94)  BP(mean): --  RR: 18 (02 Apr 2021 07:42) (18 - 20)  SpO2: 95% (02 Apr 2021 07:42) (92% - 98%)      PHYSICAL EXAM  All physical exam findings normal, except those marked:  General:	Alert, active, cooperative, NAD, well hydrated  Neck		Normal: supple, no cervical adenopathy, no palpable thyroid  Cardiovascular	Normal: regular rate, normal S1, S2, no murmurs  Respiratory	Normal: no chest wall deformity, normal respiratory pattern, CTA B/L  Abdominal	Normal: soft, ND, NT, bowel sounds present, no masses, no organomegaly  		Normal normal genitalia, testes descended, circumcised/uncircumcised  .		Elian stage:			Breast elian:  .		Menstrual history:  Extremities	Normal: FROM x4  Skin		Normal: intact and not indurated, no rash, no acanthosis nigricans  Neurologic	Normal: grossly intact    LABS  VBG - ( 01 Apr 2021 19:58 )  pH: 7.40  /  pCO2: 28    /  pO2: 40    / HCO3: 18    / Base Excess: -5.7  /  SvO2: 77    / Lactate: ?1.8                           14.0   6.81  )-----------( 191      ( 02 Apr 2021 06:26 )             43.8     04-02    137  |  105  |  10  ----------------------------<  226<H>  3.5   |  20  |  0.6<L>    Ca    8.3<L>      02 Apr 2021 06:26          CAPILLARY BLOOD GLUCOSE      POCT Blood Glucose.: 225 mg/dL (02 Apr 2021 10:33)  POCT Blood Glucose.: 215 mg/dL (02 Apr 2021 09:47)  POCT Blood Glucose.: 199 mg/dL (02 Apr 2021 07:39)  POCT Blood Glucose.: 210 mg/dL (02 Apr 2021 06:38)  POCT Blood Glucose.: 206 mg/dL (02 Apr 2021 05:38)  POCT Blood Glucose.: 177 mg/dL (02 Apr 2021 04:26)  POCT Blood Glucose.: 190 mg/dL (02 Apr 2021 03:29)  POCT Blood Glucose.: 236 mg/dL (02 Apr 2021 01:29)  POCT Blood Glucose.: 253 mg/dL (02 Apr 2021 00:37)  POCT Blood Glucose.: 260 mg/dL (01 Apr 2021 23:37)  POCT Blood Glucose.: 257 mg/dL (01 Apr 2021 21:48)   Patient is a 35y old  Female who presents with a chief complaint of DKA (02 Apr 2021 08:35)    HPI:  34 y/o F with no PMH now presenting to ED with 4 day h/o SOB and cough. Pt went to urgent care to get rapid swab for COVID which came back negative. Now presented to ED with worsening symptoms. In the ED pt was found to have DKA along with possible PNA on Xray. Pt was given IV insulin along with antibiotics and being admitted to MICU. (01 Apr 2021 21:52)      FAMILY HISTORY:  DM2 in father  No family Hx of DL, Heart disease      PAST MEDICAL & SURGICAL HISTORY:  Migraines    No significant past surgical history      Birth History:  Developmental History:    Review of Systems:  All review of systems negative, except for those marked:  General:		[x] Abnormal: Lethargy  Pulmonary:		[x] Abnormal: Cough  Cardiac:		[] Abnormal:  Gastrointestinal:	[] Abnormal:  ENT:			[] Abnormal:  Renal/Urologic:		[] Abnormal:  Musculoskeletal:	[] Abnormal:  Endocrine:		[] Abnormal:  Hematologic:		[] Abnormal:  Neurologic:		[] Abnormal:  Skin:			[] Abnormal:  Allergy/Immune:	[] Abnormal:  Psychiatric:		[] Abnormal:    Allergies    No Known Allergies    Intolerances      MEDICATIONS  (STANDING):  azithromycin  IVPB 500 milliGRAM(s) IV Intermittent every 24 hours  cefTRIAXone   IVPB 1000 milliGRAM(s) IV Intermittent every 24 hours  dextrose 5% + sodium chloride 0.45% 1000 milliLiter(s) (100 mL/Hr) IV Continuous <Continuous>  enoxaparin Injectable 40 milliGRAM(s) SubCutaneous daily  insulin regular Infusion 8 Unit(s)/Hr (8 mL/Hr) IV Continuous <Continuous>    MEDICATIONS  (PRN):  ondansetron Injectable 4 milliGRAM(s) IV Push every 8 hours PRN Nausea and/or Vomiting      Vital Signs Last 24 Hrs  T(C): 37.4 (02 Apr 2021 07:42), Max: 38.3 (01 Apr 2021 19:32)  T(F): 99.4 (02 Apr 2021 07:42), Max: 100.9 (01 Apr 2021 19:32)  HR: 112 (02 Apr 2021 07:42) (110 - 135)  BP: 120/70 (02 Apr 2021 07:42) (110/72 - 129/94)  BP(mean): --  RR: 18 (02 Apr 2021 07:42) (18 - 20)  SpO2: 95% (02 Apr 2021 07:42) (92% - 98%)      PHYSICAL EXAM  All physical exam findings normal, except those marked:  General:	Appears lethargic but responds appropriately to questions   Neck		Normal: supple, no cervical adenopathy, no palpable thyroid  Cardiovascular	Normal: regular rate, normal S1, S2, no murmurs  Respiratory	Normal: no chest wall deformity, normal respiratory pattern, CTA B/L  Abdominal	Normal: soft, ND, NT, bowel sounds present, no masses, no organomegaly  		Normal normal genitalia, testes descended, circumcised/uncircumcised  .		Elian stage:			Breast elian:  .		Menstrual history:  Extremities	Normal: FROM x4  Skin		Normal: intact and not indurated, no rash, no acanthosis nigricans  Neurologic	Normal: grossly intact    LABS  VBG - ( 01 Apr 2021 19:58 )  pH: 7.40  /  pCO2: 28    /  pO2: 40    / HCO3: 18    / Base Excess: -5.7  /  SvO2: 77    / Lactate: ?1.8                           14.0   6.81  )-----------( 191      ( 02 Apr 2021 06:26 )             43.8     04-02    137  |  105  |  10  ----------------------------<  226<H>  3.5   |  20  |  0.6<L>    Ca    8.3<L>      02 Apr 2021 06:26          CAPILLARY BLOOD GLUCOSE      POCT Blood Glucose.: 225 mg/dL (02 Apr 2021 10:33)  POCT Blood Glucose.: 215 mg/dL (02 Apr 2021 09:47)  POCT Blood Glucose.: 199 mg/dL (02 Apr 2021 07:39)  POCT Blood Glucose.: 210 mg/dL (02 Apr 2021 06:38)  POCT Blood Glucose.: 206 mg/dL (02 Apr 2021 05:38)  POCT Blood Glucose.: 177 mg/dL (02 Apr 2021 04:26)  POCT Blood Glucose.: 190 mg/dL (02 Apr 2021 03:29)  POCT Blood Glucose.: 236 mg/dL (02 Apr 2021 01:29)  POCT Blood Glucose.: 253 mg/dL (02 Apr 2021 00:37)  POCT Blood Glucose.: 260 mg/dL (01 Apr 2021 23:37)  POCT Blood Glucose.: 257 mg/dL (01 Apr 2021 21:48)   Patient is a 35y old  Female who presents with a chief complaint of DKA (02 Apr 2021 08:35)    HPI:  34 y/o F with no PMH now presenting to ED with 4 day h/o SOB and cough. Pt went to urgent care to get rapid swab for COVID which came back negative. Now presented to ED with worsening symptoms. In the ED pt was found to have DKA along with possible PNA on Xray. Pt was given IV insulin along with antibiotics and being admitted to MICU. (01 Apr 2021 21:52)      FAMILY HISTORY:  DM2 in father  No family Hx of DL, Heart disease      PAST MEDICAL & SURGICAL HISTORY:  Migraines    No significant past surgical history      Birth History:  Developmental History:    Review of Systems:  All review of systems negative, except for those marked:  General:		[x] Abnormal: Lethargy  Pulmonary:		[x] Abnormal: Cough  Cardiac:		[] Abnormal:  Gastrointestinal:	[] Abnormal:  ENT:			[] Abnormal:  Renal/Urologic:		[] Abnormal:  Musculoskeletal:	[] Abnormal:  Endocrine:		[] Abnormal:  Hematologic:		[] Abnormal:  Neurologic:		[] Abnormal:  Skin:			[] Abnormal:  Allergy/Immune:	[] Abnormal:  Psychiatric:		[] Abnormal:    Allergies    No Known Allergies    Intolerances      MEDICATIONS  (STANDING):  azithromycin  IVPB 500 milliGRAM(s) IV Intermittent every 24 hours  cefTRIAXone   IVPB 1000 milliGRAM(s) IV Intermittent every 24 hours  dextrose 5% + sodium chloride 0.45% 1000 milliLiter(s) (100 mL/Hr) IV Continuous <Continuous>  enoxaparin Injectable 40 milliGRAM(s) SubCutaneous daily  insulin regular Infusion 8 Unit(s)/Hr (8 mL/Hr) IV Continuous <Continuous>    MEDICATIONS  (PRN):  ondansetron Injectable 4 milliGRAM(s) IV Push every 8 hours PRN Nausea and/or Vomiting      Vital Signs Last 24 Hrs  T(C): 37.4 (02 Apr 2021 07:42), Max: 38.3 (01 Apr 2021 19:32)  T(F): 99.4 (02 Apr 2021 07:42), Max: 100.9 (01 Apr 2021 19:32)  HR: 112 (02 Apr 2021 07:42) (110 - 135)  BP: 120/70 (02 Apr 2021 07:42) (110/72 - 129/94)  BP(mean): --  RR: 18 (02 Apr 2021 07:42) (18 - 20)  SpO2: 95% (02 Apr 2021 07:42) (92% - 98%)      PHYSICAL EXAM  All physical exam findings normal, except those marked:  General:	Appears lethargic but responds appropriately to questions   Neck		Normal: supple, no cervical adenopathy, no palpable thyroid  Cardiovascular	Normal: regular rate, normal S1, S2, no murmurs  Respiratory	Normal: no chest wall deformity, normal respiratory pattern, CTA B/L  Abdominal	Normal: soft, ND, NT, bowel sounds present, no masses, no organomegaly  		Normal normal genitalia, testes descended, circumcised/uncircumcised  .		Elian stage:			Breast elian:  .		Menstrual history:  Extremities	Normal: FROM x4  Skin		Normal: Diffuse abdominal striae, no acanthosis nigricans  Neurologic	Normal: grossly intact    LABS  VBG - ( 01 Apr 2021 19:58 )  pH: 7.40  /  pCO2: 28    /  pO2: 40    / HCO3: 18    / Base Excess: -5.7  /  SvO2: 77    / Lactate: ?1.8                           14.0   6.81  )-----------( 191      ( 02 Apr 2021 06:26 )             43.8     04-02    137  |  105  |  10  ----------------------------<  226<H>  3.5   |  20  |  0.6<L>    Ca    8.3<L>      02 Apr 2021 06:26          CAPILLARY BLOOD GLUCOSE      POCT Blood Glucose.: 225 mg/dL (02 Apr 2021 10:33)  POCT Blood Glucose.: 215 mg/dL (02 Apr 2021 09:47)  POCT Blood Glucose.: 199 mg/dL (02 Apr 2021 07:39)  POCT Blood Glucose.: 210 mg/dL (02 Apr 2021 06:38)  POCT Blood Glucose.: 206 mg/dL (02 Apr 2021 05:38)  POCT Blood Glucose.: 177 mg/dL (02 Apr 2021 04:26)  POCT Blood Glucose.: 190 mg/dL (02 Apr 2021 03:29)  POCT Blood Glucose.: 236 mg/dL (02 Apr 2021 01:29)  POCT Blood Glucose.: 253 mg/dL (02 Apr 2021 00:37)  POCT Blood Glucose.: 260 mg/dL (01 Apr 2021 23:37)  POCT Blood Glucose.: 257 mg/dL (01 Apr 2021 21:48)   Patient is a 35y old  Female who presents with a chief complaint of DKA (02 Apr 2021 08:35)    HPI:  36 y/o F with no PMH now presenting to ED with 4 day h/o SOB and cough. Pt went to urgent care to get rapid swab for COVID which came back negative. Now presented to ED with worsening symptoms. In the ED pt was found to have mild DKA along with possible PNA on Xray. Pt is on  IV insulin along with antibiotics and being admitted to MICU. (01 Apr 2021 21:52)      FAMILY HISTORY:  DM2 in father  No family Hx of DL, Heart disease      PAST MEDICAL & SURGICAL HISTORY:  Migraines    No significant past surgical history      Birth History:  Developmental History:    Review of Systems:  All review of systems negative, except for those marked:  General:		[x] Abnormal: Lethargy  Pulmonary:		[x] Abnormal: Cough  Cardiac:		[] Abnormal:  Gastrointestinal:	[] Abnormal:  ENT:			[] Abnormal:  Renal/Urologic:		[] Abnormal:  Musculoskeletal:	[] Abnormal:  Endocrine:		[] Abnormal:  Hematologic:		[] Abnormal:  Neurologic:		[] Abnormal:  Skin:			[] Abnormal:  Allergy/Immune:	[] Abnormal:  Psychiatric:		[] Abnormal:    Allergies    No Known Allergies    Intolerances      MEDICATIONS  (STANDING):  azithromycin  IVPB 500 milliGRAM(s) IV Intermittent every 24 hours  cefTRIAXone   IVPB 1000 milliGRAM(s) IV Intermittent every 24 hours  dextrose 5% + sodium chloride 0.45% 1000 milliLiter(s) (100 mL/Hr) IV Continuous <Continuous>  enoxaparin Injectable 40 milliGRAM(s) SubCutaneous daily  insulin regular Infusion 8 Unit(s)/Hr (8 mL/Hr) IV Continuous <Continuous>    MEDICATIONS  (PRN):  ondansetron Injectable 4 milliGRAM(s) IV Push every 8 hours PRN Nausea and/or Vomiting      Vital Signs Last 24 Hrs  T(C): 37.4 (02 Apr 2021 07:42), Max: 38.3 (01 Apr 2021 19:32)  T(F): 99.4 (02 Apr 2021 07:42), Max: 100.9 (01 Apr 2021 19:32)  HR: 112 (02 Apr 2021 07:42) (110 - 135)  BP: 120/70 (02 Apr 2021 07:42) (110/72 - 129/94)  BP(mean): --  RR: 18 (02 Apr 2021 07:42) (18 - 20)  SpO2: 95% (02 Apr 2021 07:42) (92% - 98%)      PHYSICAL EXAM  All physical exam findings normal, except those marked:  General:	Appears lethargic but responds appropriately to questions   Neck		Normal: supple, no cervical adenopathy, no palpable thyroid  Cardiovascular	Normal: regular rate, normal S1, S2, no murmurs  Respiratory	Normal: no chest wall deformity, normal respiratory pattern, CTA B/L  Abdominal	Normal: soft, ND, NT, large pink striae   Extremities	Normal: FROM x4  Skin		Normal: Diffuse abdominal striae, no acanthosis nigricans    LABS  VBG - ( 01 Apr 2021 19:58 )  pH: 7.40  /  pCO2: 28    /  pO2: 40    / HCO3: 18    / Base Excess: -5.7  /  SvO2: 77    / Lactate: ?1.8                           14.0   6.81  )-----------( 191      ( 02 Apr 2021 06:26 )             43.8     04-02    137  |  105  |  10  ----------------------------<  226<H>  3.5   |  20  |  0.6<L>    Ca    8.3<L>      02 Apr 2021 06:26          CAPILLARY BLOOD GLUCOSE      POCT Blood Glucose.: 225 mg/dL (02 Apr 2021 10:33)  POCT Blood Glucose.: 215 mg/dL (02 Apr 2021 09:47)  POCT Blood Glucose.: 199 mg/dL (02 Apr 2021 07:39)  POCT Blood Glucose.: 210 mg/dL (02 Apr 2021 06:38)  POCT Blood Glucose.: 206 mg/dL (02 Apr 2021 05:38)  POCT Blood Glucose.: 177 mg/dL (02 Apr 2021 04:26)  POCT Blood Glucose.: 190 mg/dL (02 Apr 2021 03:29)  POCT Blood Glucose.: 236 mg/dL (02 Apr 2021 01:29)  POCT Blood Glucose.: 253 mg/dL (02 Apr 2021 00:37)  POCT Blood Glucose.: 260 mg/dL (01 Apr 2021 23:37)  POCT Blood Glucose.: 257 mg/dL (01 Apr 2021 21:48)

## 2021-04-03 LAB
ALBUMIN SERPL ELPH-MCNC: 3.3 G/DL — LOW (ref 3.5–5.2)
ALBUMIN SERPL ELPH-MCNC: 3.3 G/DL — LOW (ref 3.5–5.2)
ALP SERPL-CCNC: 157 U/L — HIGH (ref 30–115)
ALP SERPL-CCNC: 158 U/L — HIGH (ref 30–115)
ALT FLD-CCNC: 14 U/L — SIGNIFICANT CHANGE UP (ref 0–41)
ALT FLD-CCNC: 15 U/L — SIGNIFICANT CHANGE UP (ref 0–41)
ANION GAP SERPL CALC-SCNC: 14 MMOL/L — SIGNIFICANT CHANGE UP (ref 7–14)
ANION GAP SERPL CALC-SCNC: 15 MMOL/L — HIGH (ref 7–14)
ANION GAP SERPL CALC-SCNC: 15 MMOL/L — HIGH (ref 7–14)
ANION GAP SERPL CALC-SCNC: 16 MMOL/L — HIGH (ref 7–14)
ANION GAP SERPL CALC-SCNC: 17 MMOL/L — HIGH (ref 7–14)
AST SERPL-CCNC: 30 U/L — SIGNIFICANT CHANGE UP (ref 0–41)
AST SERPL-CCNC: 38 U/L — SIGNIFICANT CHANGE UP (ref 0–41)
B-OH-BUTYR SERPL-SCNC: 0.5 MMOL/L — HIGH
BASOPHILS # BLD AUTO: 0.01 K/UL — SIGNIFICANT CHANGE UP (ref 0–0.2)
BASOPHILS NFR BLD AUTO: 0.1 % — SIGNIFICANT CHANGE UP (ref 0–1)
BILIRUB SERPL-MCNC: 0.4 MG/DL — SIGNIFICANT CHANGE UP (ref 0.2–1.2)
BILIRUB SERPL-MCNC: 0.4 MG/DL — SIGNIFICANT CHANGE UP (ref 0.2–1.2)
BUN SERPL-MCNC: 10 MG/DL — SIGNIFICANT CHANGE UP (ref 10–20)
BUN SERPL-MCNC: 7 MG/DL — LOW (ref 10–20)
BUN SERPL-MCNC: 8 MG/DL — LOW (ref 10–20)
CALCIUM SERPL-MCNC: 8 MG/DL — LOW (ref 8.5–10.1)
CALCIUM SERPL-MCNC: 8 MG/DL — LOW (ref 8.5–10.1)
CALCIUM SERPL-MCNC: 8.2 MG/DL — LOW (ref 8.5–10.1)
CALCIUM SERPL-MCNC: 8.4 MG/DL — LOW (ref 8.5–10.1)
CALCIUM SERPL-MCNC: 8.5 MG/DL — SIGNIFICANT CHANGE UP (ref 8.5–10.1)
CHLORIDE SERPL-SCNC: 102 MMOL/L — SIGNIFICANT CHANGE UP (ref 98–110)
CHLORIDE SERPL-SCNC: 103 MMOL/L — SIGNIFICANT CHANGE UP (ref 98–110)
CHLORIDE SERPL-SCNC: 104 MMOL/L — SIGNIFICANT CHANGE UP (ref 98–110)
CHLORIDE SERPL-SCNC: 105 MMOL/L — SIGNIFICANT CHANGE UP (ref 98–110)
CHLORIDE SERPL-SCNC: 106 MMOL/L — SIGNIFICANT CHANGE UP (ref 98–110)
CO2 SERPL-SCNC: 18 MMOL/L — SIGNIFICANT CHANGE UP (ref 17–32)
CO2 SERPL-SCNC: 18 MMOL/L — SIGNIFICANT CHANGE UP (ref 17–32)
CO2 SERPL-SCNC: 19 MMOL/L — SIGNIFICANT CHANGE UP (ref 17–32)
CO2 SERPL-SCNC: 20 MMOL/L — SIGNIFICANT CHANGE UP (ref 17–32)
CO2 SERPL-SCNC: 20 MMOL/L — SIGNIFICANT CHANGE UP (ref 17–32)
CREAT SERPL-MCNC: 0.5 MG/DL — LOW (ref 0.7–1.5)
CRP SERPL-MCNC: 182 MG/L — HIGH
D DIMER BLD IA.RAPID-MCNC: 213 NG/ML DDU — SIGNIFICANT CHANGE UP (ref 0–230)
EOSINOPHIL # BLD AUTO: 0 K/UL — SIGNIFICANT CHANGE UP (ref 0–0.7)
EOSINOPHIL NFR BLD AUTO: 0 % — SIGNIFICANT CHANGE UP (ref 0–8)
ERYTHROCYTE [SEDIMENTATION RATE] IN BLOOD: 58 MM/HR — HIGH (ref 0–20)
GLUCOSE BLDC GLUCOMTR-MCNC: 176 MG/DL — HIGH (ref 70–99)
GLUCOSE BLDC GLUCOMTR-MCNC: 176 MG/DL — HIGH (ref 70–99)
GLUCOSE BLDC GLUCOMTR-MCNC: 193 MG/DL — HIGH (ref 70–99)
GLUCOSE BLDC GLUCOMTR-MCNC: 206 MG/DL — HIGH (ref 70–99)
GLUCOSE BLDC GLUCOMTR-MCNC: 207 MG/DL — HIGH (ref 70–99)
GLUCOSE BLDC GLUCOMTR-MCNC: 208 MG/DL — HIGH (ref 70–99)
GLUCOSE BLDC GLUCOMTR-MCNC: 210 MG/DL — HIGH (ref 70–99)
GLUCOSE BLDC GLUCOMTR-MCNC: 210 MG/DL — HIGH (ref 70–99)
GLUCOSE BLDC GLUCOMTR-MCNC: 212 MG/DL — HIGH (ref 70–99)
GLUCOSE BLDC GLUCOMTR-MCNC: 213 MG/DL — HIGH (ref 70–99)
GLUCOSE BLDC GLUCOMTR-MCNC: 217 MG/DL — HIGH (ref 70–99)
GLUCOSE BLDC GLUCOMTR-MCNC: 228 MG/DL — HIGH (ref 70–99)
GLUCOSE BLDC GLUCOMTR-MCNC: 233 MG/DL — HIGH (ref 70–99)
GLUCOSE BLDC GLUCOMTR-MCNC: 235 MG/DL — HIGH (ref 70–99)
GLUCOSE BLDC GLUCOMTR-MCNC: 245 MG/DL — HIGH (ref 70–99)
GLUCOSE SERPL-MCNC: 206 MG/DL — HIGH (ref 70–99)
GLUCOSE SERPL-MCNC: 215 MG/DL — HIGH (ref 70–99)
GLUCOSE SERPL-MCNC: 220 MG/DL — HIGH (ref 70–99)
GLUCOSE SERPL-MCNC: 237 MG/DL — HIGH (ref 70–99)
GLUCOSE SERPL-MCNC: 259 MG/DL — HIGH (ref 70–99)
HCT VFR BLD CALC: 43.1 % — SIGNIFICANT CHANGE UP (ref 37–47)
HGB BLD-MCNC: 13.9 G/DL — SIGNIFICANT CHANGE UP (ref 12–16)
IMM GRANULOCYTES NFR BLD AUTO: 0.7 % — HIGH (ref 0.1–0.3)
LACTATE SERPL-SCNC: 1.1 MMOL/L — SIGNIFICANT CHANGE UP (ref 0.7–2)
LYMPHOCYTES # BLD AUTO: 1.12 K/UL — LOW (ref 1.2–3.4)
LYMPHOCYTES # BLD AUTO: 13.3 % — LOW (ref 20.5–51.1)
MAGNESIUM SERPL-MCNC: 1.8 MG/DL — SIGNIFICANT CHANGE UP (ref 1.8–2.4)
MCHC RBC-ENTMCNC: 24 PG — LOW (ref 27–31)
MCHC RBC-ENTMCNC: 32.3 G/DL — SIGNIFICANT CHANGE UP (ref 32–37)
MCV RBC AUTO: 74.6 FL — LOW (ref 81–99)
MONOCYTES # BLD AUTO: 0.29 K/UL — SIGNIFICANT CHANGE UP (ref 0.1–0.6)
MONOCYTES NFR BLD AUTO: 3.4 % — SIGNIFICANT CHANGE UP (ref 1.7–9.3)
NEUTROPHILS # BLD AUTO: 6.96 K/UL — HIGH (ref 1.4–6.5)
NEUTROPHILS NFR BLD AUTO: 82.5 % — HIGH (ref 42.2–75.2)
NRBC # BLD: 0 /100 WBCS — SIGNIFICANT CHANGE UP (ref 0–0)
PLATELET # BLD AUTO: 161 K/UL — SIGNIFICANT CHANGE UP (ref 130–400)
POTASSIUM SERPL-MCNC: 3.2 MMOL/L — LOW (ref 3.5–5)
POTASSIUM SERPL-MCNC: 3.4 MMOL/L — LOW (ref 3.5–5)
POTASSIUM SERPL-MCNC: 3.6 MMOL/L — SIGNIFICANT CHANGE UP (ref 3.5–5)
POTASSIUM SERPL-SCNC: 3.2 MMOL/L — LOW (ref 3.5–5)
POTASSIUM SERPL-SCNC: 3.4 MMOL/L — LOW (ref 3.5–5)
POTASSIUM SERPL-SCNC: 3.6 MMOL/L — SIGNIFICANT CHANGE UP (ref 3.5–5)
PROT SERPL-MCNC: 6.5 G/DL — SIGNIFICANT CHANGE UP (ref 6–8)
PROT SERPL-MCNC: 6.7 G/DL — SIGNIFICANT CHANGE UP (ref 6–8)
RBC # BLD: 5.78 M/UL — HIGH (ref 4.2–5.4)
RBC # FLD: 13.9 % — SIGNIFICANT CHANGE UP (ref 11.5–14.5)
RHEUMATOID FACT SERPL-ACNC: <10 IU/ML — SIGNIFICANT CHANGE UP (ref 0–13)
SARS-COV-2 RNA SPEC QL NAA+PROBE: SIGNIFICANT CHANGE UP
SODIUM SERPL-SCNC: 136 MMOL/L — SIGNIFICANT CHANGE UP (ref 135–146)
SODIUM SERPL-SCNC: 137 MMOL/L — SIGNIFICANT CHANGE UP (ref 135–146)
SODIUM SERPL-SCNC: 139 MMOL/L — SIGNIFICANT CHANGE UP (ref 135–146)
SODIUM SERPL-SCNC: 140 MMOL/L — SIGNIFICANT CHANGE UP (ref 135–146)
SODIUM SERPL-SCNC: 140 MMOL/L — SIGNIFICANT CHANGE UP (ref 135–146)
TROPONIN T SERPL-MCNC: <0.01 NG/ML — SIGNIFICANT CHANGE UP
TROPONIN T SERPL-MCNC: <0.01 NG/ML — SIGNIFICANT CHANGE UP
TSH SERPL-MCNC: 0.66 UIU/ML — SIGNIFICANT CHANGE UP (ref 0.27–4.2)
WBC # BLD: 8.44 K/UL — SIGNIFICANT CHANGE UP (ref 4.8–10.8)
WBC # FLD AUTO: 8.44 K/UL — SIGNIFICANT CHANGE UP (ref 4.8–10.8)

## 2021-04-03 PROCEDURE — 99232 SBSQ HOSP IP/OBS MODERATE 35: CPT

## 2021-04-03 PROCEDURE — 71045 X-RAY EXAM CHEST 1 VIEW: CPT | Mod: 26

## 2021-04-03 RX ORDER — DEXAMETHASONE 0.5 MG/5ML
6 ELIXIR ORAL EVERY 24 HOURS
Refills: 0 | Status: DISCONTINUED | OUTPATIENT
Start: 2021-04-03 | End: 2021-04-09

## 2021-04-03 RX ORDER — INSULIN HUMAN 100 [IU]/ML
1 INJECTION, SOLUTION SUBCUTANEOUS
Qty: 100 | Refills: 0 | Status: DISCONTINUED | OUTPATIENT
Start: 2021-04-03 | End: 2021-04-03

## 2021-04-03 RX ORDER — SODIUM CHLORIDE 9 MG/ML
1000 INJECTION, SOLUTION INTRAVENOUS
Refills: 0 | Status: DISCONTINUED | OUTPATIENT
Start: 2021-04-03 | End: 2021-04-03

## 2021-04-03 RX ORDER — CEFTRIAXONE 500 MG/1
1000 INJECTION, POWDER, FOR SOLUTION INTRAMUSCULAR; INTRAVENOUS ONCE
Refills: 0 | Status: COMPLETED | OUTPATIENT
Start: 2021-04-03 | End: 2021-04-03

## 2021-04-03 RX ORDER — METOCLOPRAMIDE HCL 10 MG
10 TABLET ORAL ONCE
Refills: 0 | Status: COMPLETED | OUTPATIENT
Start: 2021-04-03 | End: 2021-04-03

## 2021-04-03 RX ORDER — CEFTRIAXONE 500 MG/1
1000 INJECTION, POWDER, FOR SOLUTION INTRAMUSCULAR; INTRAVENOUS EVERY 24 HOURS
Refills: 0 | Status: DISCONTINUED | OUTPATIENT
Start: 2021-04-04 | End: 2021-04-05

## 2021-04-03 RX ORDER — INSULIN HUMAN 100 [IU]/ML
7 INJECTION, SOLUTION SUBCUTANEOUS
Qty: 100 | Refills: 0 | Status: DISCONTINUED | OUTPATIENT
Start: 2021-04-03 | End: 2021-04-03

## 2021-04-03 RX ORDER — INSULIN GLARGINE 100 [IU]/ML
21 INJECTION, SOLUTION SUBCUTANEOUS EVERY MORNING
Refills: 0 | Status: DISCONTINUED | OUTPATIENT
Start: 2021-04-03 | End: 2021-04-03

## 2021-04-03 RX ORDER — INSULIN LISPRO 100/ML
7 VIAL (ML) SUBCUTANEOUS
Refills: 0 | Status: DISCONTINUED | OUTPATIENT
Start: 2021-04-03 | End: 2021-04-03

## 2021-04-03 RX ORDER — SODIUM CHLORIDE 9 MG/ML
1000 INJECTION, SOLUTION INTRAVENOUS
Refills: 0 | Status: DISCONTINUED | OUTPATIENT
Start: 2021-04-03 | End: 2021-04-07

## 2021-04-03 RX ORDER — POTASSIUM CHLORIDE 20 MEQ
20 PACKET (EA) ORAL ONCE
Refills: 0 | Status: DISCONTINUED | OUTPATIENT
Start: 2021-04-03 | End: 2021-04-03

## 2021-04-03 RX ORDER — INSULIN GLARGINE 100 [IU]/ML
20 INJECTION, SOLUTION SUBCUTANEOUS ONCE
Refills: 0 | Status: COMPLETED | OUTPATIENT
Start: 2021-04-03 | End: 2021-04-03

## 2021-04-03 RX ORDER — POTASSIUM CHLORIDE 20 MEQ
40 PACKET (EA) ORAL EVERY 4 HOURS
Refills: 0 | Status: DISCONTINUED | OUTPATIENT
Start: 2021-04-03 | End: 2021-04-03

## 2021-04-03 RX ORDER — ENOXAPARIN SODIUM 100 MG/ML
40 INJECTION SUBCUTANEOUS
Refills: 0 | Status: DISCONTINUED | OUTPATIENT
Start: 2021-04-03 | End: 2021-04-06

## 2021-04-03 RX ORDER — CEFTRIAXONE 500 MG/1
INJECTION, POWDER, FOR SOLUTION INTRAMUSCULAR; INTRAVENOUS
Refills: 0 | Status: DISCONTINUED | OUTPATIENT
Start: 2021-04-03 | End: 2021-04-05

## 2021-04-03 RX ORDER — POTASSIUM CHLORIDE 20 MEQ
20 PACKET (EA) ORAL ONCE
Refills: 0 | Status: COMPLETED | OUTPATIENT
Start: 2021-04-03 | End: 2021-04-03

## 2021-04-03 RX ORDER — INSULIN HUMAN 100 [IU]/ML
3 INJECTION, SOLUTION SUBCUTANEOUS
Qty: 100 | Refills: 0 | Status: DISCONTINUED | OUTPATIENT
Start: 2021-04-03 | End: 2021-04-07

## 2021-04-03 RX ADMIN — SODIUM CHLORIDE 100 MILLILITER(S): 9 INJECTION, SOLUTION INTRAVENOUS at 03:43

## 2021-04-03 RX ADMIN — INSULIN HUMAN 1 UNIT(S)/HR: 100 INJECTION, SOLUTION SUBCUTANEOUS at 03:43

## 2021-04-03 RX ADMIN — Medication 20 MILLIEQUIVALENT(S): at 14:59

## 2021-04-03 RX ADMIN — Medication 2: at 17:18

## 2021-04-03 RX ADMIN — ONDANSETRON 4 MILLIGRAM(S): 8 TABLET, FILM COATED ORAL at 03:43

## 2021-04-03 RX ADMIN — Medication 6 MILLIGRAM(S): at 06:48

## 2021-04-03 RX ADMIN — ONDANSETRON 4 MILLIGRAM(S): 8 TABLET, FILM COATED ORAL at 23:54

## 2021-04-03 RX ADMIN — Medication 5 UNIT(S): at 17:17

## 2021-04-03 RX ADMIN — Medication 7 UNIT(S): at 20:24

## 2021-04-03 RX ADMIN — Medication 10 MILLIGRAM(S): at 06:49

## 2021-04-03 RX ADMIN — INSULIN HUMAN 3 UNIT(S)/HR: 100 INJECTION, SOLUTION SUBCUTANEOUS at 23:42

## 2021-04-03 RX ADMIN — ENOXAPARIN SODIUM 40 MILLIGRAM(S): 100 INJECTION SUBCUTANEOUS at 17:18

## 2021-04-03 RX ADMIN — ONDANSETRON 4 MILLIGRAM(S): 8 TABLET, FILM COATED ORAL at 11:50

## 2021-04-03 RX ADMIN — SODIUM CHLORIDE 75 MILLILITER(S): 9 INJECTION, SOLUTION INTRAVENOUS at 23:44

## 2021-04-03 RX ADMIN — INSULIN GLARGINE 20 UNIT(S): 100 INJECTION, SOLUTION SUBCUTANEOUS at 14:58

## 2021-04-03 RX ADMIN — CEFTRIAXONE 1000 MILLIGRAM(S): 500 INJECTION, POWDER, FOR SOLUTION INTRAMUSCULAR; INTRAVENOUS at 06:48

## 2021-04-03 NOTE — CONSULT NOTE ADULT - ASSESSMENT
ASSESSMENT  36 y/o F with no PMH now presenting to ED with 4 day h/o SOB and cough.      IMPRESSION  #DKA  #Pneumonia, community acqured  - CT Angio Chest PE Protocol w/ IV Cont (04.02.21 @ 11:46): No central pulmonary embolism. Patchy bilateral consolidative opacities, likely due to multifocal pneumonia. Follow-up to resolution is recommended. Mild mediastinal lymphadenopathy. nd of copied text >  - COVID negative PCR x 2  - COVID ab positive    #Abx allergy: NKDA    RECOMMENDATIONS  - please check urine strep and urine legionella  - HIV screen     This is a preliminary incomplete pended note, all final recommendations to follow after interview and examination of the patient.    Spectra [id number] ASSESSMENT  36 y/o F with no PMH now presenting to ED with 4 day h/o SOB and cough.    IMPRESSION  #DKA  #Pneumonia, community acqured vs COVID pneumonia  - CT Angio Chest PE Protocol w/ IV Cont (04.02.21 @ 11:46): No central pulmonary embolism. Patchy bilateral consolidative opacities, likely due to multifocal pneumonia. Follow-up to resolution is recommended. Mild mediastinal lymphadenopathy. nd of copied text >  - COVID negative PCR x 2  - COVID ab positive  - D-Dimer Assay, Quantitative: 213: Manufacturers recommended Cut off for VTE is 230 ng/ml D-DU ng/mL DDU (04.03.21 @ 11:30)  - Procalcitonin, Serum: 0.09 (04.02.21 @ 00:09)    #Abx allergy: NKDA    RECOMMENDATIONS  - please check urine strep and urine legionella  - HIV screen   - continue antibiotics for now ; repeat procalcitonin with AM labs tomorrow; if remains stable, can plan to stop   - follow-up CRP -- if > 75, consider tocilizumab     Please call or message on [employer] Teams if with any questions.  Spectra [phone]

## 2021-04-03 NOTE — CONSULT NOTE ADULT - SUBJECTIVE AND OBJECTIVE BOX
ISA GOTTLIEB  35y, Female  Allergy: No Known Allergies      CHIEF COMPLAINT: DKA (03 Apr 2021 06:38)      LOS  2d    HPI:  34 y/o F with no PMH now presenting to ED with 4 day h/o SOB and cough. Pt went to urgent care to get rapid swab for COVID which came back negative. Now presented to ED with worsening symptoms. In the ED pt was found to have DKA along with PNA on Xray. Pt was given IV insulin along with antibiotics and called to MICU (01 Apr 2021 21:52)    INFECTIOUS DISEASE HISTORY:  History as above  WBC Count: 6.53 K/uL (04.01.21 @ 18:39)  She ha remained afebrile  CT Chest with multifocal pneumona.     PAST MEDICAL & SURGICAL HISTORY:  Migraines    No significant past surgical history        FAMILY HISTORY  No pertinent family history in first degree relatives        SOCIAL HISTORY  Social History:        ROS  General: Denies rigors, nightsweats  HEENT: Denies headache, rhinorrhea, sore throat, eye pain  CV: Denies CP, palpitations  PULM: Denies wheezing, hemoptysis  GI: Denies hematemesis, hematochezia, melena  : Denies discharge, hematuria  MSK: Denies arthralgias, myalgias  SKIN: Denies rash, lesions  NEURO: Denies paresthesias, weakness  PSYCH: Denies depression, anxiety    VITALS:  T(F): 98, Max: 98.4 (04-02-21 @ 20:30)  HR: 117  BP: 105/57  RR: 18Vital Signs Last 24 Hrs  T(C): 36.7 (03 Apr 2021 10:30), Max: 36.9 (02 Apr 2021 20:30)  T(F): 98 (03 Apr 2021 10:30), Max: 98.4 (02 Apr 2021 20:30)  HR: 117 (03 Apr 2021 10:30) (100 - 124)  BP: 105/57 (03 Apr 2021 10:30) (104/61 - 118/69)  BP(mean): 81 (03 Apr 2021 09:30) (81 - 82)  RR: 18 (03 Apr 2021 10:30) (16 - 20)  SpO2: 98% (03 Apr 2021 10:30) (93% - 98%)    PHYSICAL EXAM:  Gen: NAD, resting in bed  HEENT: Normocephalic, atraumatic  Neck: supple, no lymphadenopathy  CV: Regular rate & regular rhythm  Lungs: decreased BS at bases, no fremitus  Abdomen: Soft, BS present  Ext: Warm, well perfused  Neuro: non focal, awake  Skin: no rash, no erythema  Lines: no phlebitis    TESTS & MEASUREMENTS:                        13.9   8.44  )-----------( 161      ( 03 Apr 2021 04:30 )             43.1     04-03    136  |  102  |  7<L>  ----------------------------<  206<H>  3.4<L>   |  20  |  0.5<L>    Ca    8.0<L>      03 Apr 2021 11:30  Mg     1.8     04-03    TPro  6.5  /  Alb  3.3<L>  /  TBili  0.4  /  DBili  x   /  AST  30  /  ALT  14  /  AlkPhos  158<H>  04-03    eGFR if Non African American: 125 mL/min/1.73M2 (04-03-21 @ 11:30)  eGFR if : 144 mL/min/1.73M2 (04-03-21 @ 11:30)  eGFR if Non African American: 125 mL/min/1.73M2 (04-03-21 @ 04:30)  eGFR if : 144 mL/min/1.73M2 (04-03-21 @ 04:30)  eGFR if Non African American: 125 mL/min/1.73M2 (04-02-21 @ 23:30)  eGFR if : 144 mL/min/1.73M2 (04-02-21 @ 23:30)  eGFR if Non African American: 111 mL/min/1.73M2 (04-02-21 @ 16:59)  eGFR if : 129 mL/min/1.73M2 (04-02-21 @ 16:59)    LIVER FUNCTIONS - ( 03 Apr 2021 04:30 )  Alb: 3.3 g/dL / Pro: 6.5 g/dL / ALK PHOS: 158 U/L / ALT: 14 U/L / AST: 30 U/L / GGT: x               Culture - Blood (collected 04-01-21 @ 20:50)  Source: .Blood Blood-Peripheral  Preliminary Report (04-03-21 @ 04:01):    No growth to date.    Culture - Blood (collected 04-01-21 @ 20:44)  Source: .Blood Blood-Peripheral  Preliminary Report (04-03-21 @ 04:01):    No growth to date.        Lactate, Blood: 1.1 mmol/L (04-02-21 @ 23:30)  Blood Gas Venous - Lactate: ?1.8 mmoL/L (04-01-21 @ 19:58)  Lactate, Blood: 2.2 mmol/L (04-01-21 @ 18:39)      INFECTIOUS DISEASES TESTING  COVID-19 PCR: NotDetec (04-03-21 @ 06:30)  Procalcitonin, Serum: 0.09 ng/mL (04-02-21 @ 00:09)  COVID-19 PCR: NotDetec (04-01-21 @ 19:19)      RADIOLOGY & ADDITIONAL TESTS:  I have personally reviewed the last Chest xray  CXR      CT  CT Angio Chest PE Protocol w/ IV Cont:   EXAM:  CT ANGIO CHEST PE PROTOCOL IC            PROCEDURE DATE:  04/02/2021            INTERPRETATION:  CLINICAL INDICATION: PE    TECHNIQUE:  CTA of the thorax was performed after administration of contrast per the PE protocol. Sagittal and coronal reformats were performed as well as 3D reconstructions. Breathing motion artifact limits evaluation.  Intravenous contrast: 65 cc of Optiray 320    COMPARISON: no prior chest CT, correlation with same day chest radiograph.    INTERPRETATION:    PULMONARY ARTERIES: There are no pulmonary emboli. Limited evaluation of the segmental and subsegmental branches due to breathing artifact.    AIRWAYS/LUNGS/PLEURA: The central tracheobronchial tree is patent.  Patchy bilateral consolidative opacities, greater on the right There is no pleural effusion. There is no pneumothorax.    MEDIASTINUM: There are mildly enlarged mediastinal lymph nodes, measuring up to 1.4 cm in the subcarinal region.  There are no enlarged axillary lymph nodes. The visualized portion of the thyroid gland is unremarkable.    HEART AND VESSELS: The heart is normal in size. The thoracic aorta has a normal caliber. There is no pericardial effusion.    UPPER ABDOMEN: Images of the upper abdomen demonstrate no abnormality.    BONES AND SOFT TISSUES: The bones are unremarkable.  The soft tissues are unremarkable.    TUBES AND LINES: None.    IMPRESSION:    1. No central pulmonary embolism.    2. Patchy bilateral consolidative opacities, likely due to multifocal pneumonia. Follow-up to resolution is recommended.    3. Mild mediastinal lymphadenopathy.                SHAYNE NASSAR MD; Attending Radiologist  This document has been electronically signed. Apr 2 2021 12:32PM (04-02-21 @ 11:46)      CARDIOLOGY TESTING  12 Lead ECG:   Ventricular Rate 113 BPM    Atrial Rate 113 BPM    P-R Interval 144 ms    QRS Duration 128 ms    Q-T Interval 362 ms    QTC Calculation(Bazett) 496 ms    P Axis 31 degrees    R Axis -73 degrees    T Axis 53 degrees    Diagnosis Line Sinus tachycardia  Left axis deviation  Right bundle branch block  Possible Lateral infarct , age undetermined  Abnormal ECG    Confirmed by Tenzin Arguello ([id number]) on 4/1/2021 10:13:41 PM (04-01-21 @ 19:57)      MEDICATIONS  cefTRIAXone   IVPB     dexAMETHasone  Injectable 6 IV Push every 24 hours  dextrose 40% Gel 15 Oral once  dextrose 5% + sodium chloride 0.45%. 1000 IV Continuous <Continuous>  dextrose 5%. 1000 IV Continuous <Continuous>  dextrose 5%. 1000 IV Continuous <Continuous>  dextrose 50% Injectable 25 IV Push once  dextrose 50% Injectable 12.5 IV Push once  dextrose 50% Injectable 25 IV Push once  enoxaparin Injectable 40 SubCutaneous two times a day  glucagon  Injectable 1 IntraMuscular once  insulin glargine Injectable (LANTUS) 15 SubCutaneous at bedtime  insulin lispro (ADMELOG) corrective regimen sliding scale  SubCutaneous three times a day before meals  insulin lispro Injectable (ADMELOG) 5 SubCutaneous three times a day before meals  insulin regular Infusion 7 IV Continuous <Continuous>  levoFLOXacin IVPB     potassium chloride  20 mEq/100 mL IVPB 20 IV Intermittent once      Weight  Weight (kg): 85 (04-02-21 @ 23:30)    ANTIBIOTICS:  cefTRIAXone   IVPB      levoFLOXacin IVPB          ALLERGIES:  No Known Allergies           ISA GOTTLIEB  35y, Female  Allergy: No Known Allergies      CHIEF COMPLAINT: DKA (03 Apr 2021 06:38)      LOS  2d    HPI:  34 y/o F with no PMH now presenting to ED with 4 day h/o SOB and cough. Pt went to urgent care to get rapid swab for COVID which came back negative. Now presented to ED with worsening symptoms. In the ED pt was found to have DKA along with PNA on Xray. Pt was given IV insulin along with antibiotics and called to MICU (01 Apr 2021 21:52)    INFECTIOUS DISEASE HISTORY:  History as above  WBC Count: 6.53 K/uL (04.01.21 @ 18:39)  She ha remained afebrile  CT Chest with multifocal pneumona.   Had multiple negative COVID PCR tests  COVID ab positive here  Denies any nausea, vomiting, abdominal pain, diarrhea, rashes, change in taste/smell.   Unaware of any DM diagnosis previously.   No recent sick contacts    PAST MEDICAL & SURGICAL HISTORY:  Migraines    No significant past surgical history        FAMILY HISTORY  No pertinent family history in first degree relatives        SOCIAL HISTORY  Social History:        ROS  General: Denies rigors, nightsweats  HEENT: Denies headache, rhinorrhea, sore throat, eye pain  CV: Denies CP, palpitations  PULM: Denies wheezing, hemoptysis  GI: Denies hematemesis, hematochezia, melena  : Denies discharge, hematuria  MSK: Denies arthralgias, myalgias  SKIN: Denies rash, lesions  NEURO: Denies paresthesias, weakness  PSYCH: Denies depression, anxiety    VITALS:  T(F): 98, Max: 98.4 (04-02-21 @ 20:30)  HR: 117  BP: 105/57  RR: 18Vital Signs Last 24 Hrs  T(C): 36.7 (03 Apr 2021 10:30), Max: 36.9 (02 Apr 2021 20:30)  T(F): 98 (03 Apr 2021 10:30), Max: 98.4 (02 Apr 2021 20:30)  HR: 117 (03 Apr 2021 10:30) (100 - 124)  BP: 105/57 (03 Apr 2021 10:30) (104/61 - 118/69)  BP(mean): 81 (03 Apr 2021 09:30) (81 - 82)  RR: 18 (03 Apr 2021 10:30) (16 - 20)  SpO2: 98% (03 Apr 2021 10:30) (93% - 98%)    PHYSICAL EXAM:  Gen: NAD, resting in bed  HEENT: Normocephalic, atraumatic  Neck: supple, no lymphadenopathy  CV: Regular rate & regular rhythm  Lungs: decreased BS at bases, no fremitus  Abdomen: Soft, BS present  Ext: Warm, well perfused  Neuro: non focal, awake  Skin: no rash, no erythema  Lines: no phlebitis    TESTS & MEASUREMENTS:                        13.9   8.44  )-----------( 161      ( 03 Apr 2021 04:30 )             43.1     04-03    136  |  102  |  7<L>  ----------------------------<  206<H>  3.4<L>   |  20  |  0.5<L>    Ca    8.0<L>      03 Apr 2021 11:30  Mg     1.8     04-03    TPro  6.5  /  Alb  3.3<L>  /  TBili  0.4  /  DBili  x   /  AST  30  /  ALT  14  /  AlkPhos  158<H>  04-03    eGFR if Non African American: 125 mL/min/1.73M2 (04-03-21 @ 11:30)  eGFR if : 144 mL/min/1.73M2 (04-03-21 @ 11:30)  eGFR if Non African American: 125 mL/min/1.73M2 (04-03-21 @ 04:30)  eGFR if : 144 mL/min/1.73M2 (04-03-21 @ 04:30)  eGFR if Non African American: 125 mL/min/1.73M2 (04-02-21 @ 23:30)  eGFR if : 144 mL/min/1.73M2 (04-02-21 @ 23:30)  eGFR if Non African American: 111 mL/min/1.73M2 (04-02-21 @ 16:59)  eGFR if : 129 mL/min/1.73M2 (04-02-21 @ 16:59)    LIVER FUNCTIONS - ( 03 Apr 2021 04:30 )  Alb: 3.3 g/dL / Pro: 6.5 g/dL / ALK PHOS: 158 U/L / ALT: 14 U/L / AST: 30 U/L / GGT: x               Culture - Blood (collected 04-01-21 @ 20:50)  Source: .Blood Blood-Peripheral  Preliminary Report (04-03-21 @ 04:01):    No growth to date.    Culture - Blood (collected 04-01-21 @ 20:44)  Source: .Blood Blood-Peripheral  Preliminary Report (04-03-21 @ 04:01):    No growth to date.        Lactate, Blood: 1.1 mmol/L (04-02-21 @ 23:30)  Blood Gas Venous - Lactate: ?1.8 mmoL/L (04-01-21 @ 19:58)  Lactate, Blood: 2.2 mmol/L (04-01-21 @ 18:39)      INFECTIOUS DISEASES TESTING  COVID-19 PCR: NotDetec (04-03-21 @ 06:30)  Procalcitonin, Serum: 0.09 ng/mL (04-02-21 @ 00:09)  COVID-19 PCR: NotDetec (04-01-21 @ 19:19)      RADIOLOGY & ADDITIONAL TESTS:  I have personally reviewed the last Chest xray  CXR      CT  CT Angio Chest PE Protocol w/ IV Cont:   EXAM:  CT ANGIO CHEST PE PROTOCOL IC            PROCEDURE DATE:  04/02/2021            INTERPRETATION:  CLINICAL INDICATION: PE    TECHNIQUE:  CTA of the thorax was performed after administration of contrast per the PE protocol. Sagittal and coronal reformats were performed as well as 3D reconstructions. Breathing motion artifact limits evaluation.  Intravenous contrast: 65 cc of Optiray 320    COMPARISON: no prior chest CT, correlation with same day chest radiograph.    INTERPRETATION:    PULMONARY ARTERIES: There are no pulmonary emboli. Limited evaluation of the segmental and subsegmental branches due to breathing artifact.    AIRWAYS/LUNGS/PLEURA: The central tracheobronchial tree is patent.  Patchy bilateral consolidative opacities, greater on the right There is no pleural effusion. There is no pneumothorax.    MEDIASTINUM: There are mildly enlarged mediastinal lymph nodes, measuring up to 1.4 cm in the subcarinal region.  There are no enlarged axillary lymph nodes. The visualized portion of the thyroid gland is unremarkable.    HEART AND VESSELS: The heart is normal in size. The thoracic aorta has a normal caliber. There is no pericardial effusion.    UPPER ABDOMEN: Images of the upper abdomen demonstrate no abnormality.    BONES AND SOFT TISSUES: The bones are unremarkable.  The soft tissues are unremarkable.    TUBES AND LINES: None.    IMPRESSION:    1. No central pulmonary embolism.    2. Patchy bilateral consolidative opacities, likely due to multifocal pneumonia. Follow-up to resolution is recommended.    3. Mild mediastinal lymphadenopathy.                SHAYNE NASSAR MD; Attending Radiologist  This document has been electronically signed. Apr 2 2021 12:32PM (04-02-21 @ 11:46)      CARDIOLOGY TESTING  12 Lead ECG:   Ventricular Rate 113 BPM    Atrial Rate 113 BPM    P-R Interval 144 ms    QRS Duration 128 ms    Q-T Interval 362 ms    QTC Calculation(Bazett) 496 ms    P Axis 31 degrees    R Axis -73 degrees    T Axis 53 degrees    Diagnosis Line Sinus tachycardia  Left axis deviation  Right bundle branch block  Possible Lateral infarct , age undetermined  Abnormal ECG    Confirmed by Tenzin Arguello ([id number]) on 4/1/2021 10:13:41 PM (04-01-21 @ 19:57)      MEDICATIONS  cefTRIAXone   IVPB     dexAMETHasone  Injectable 6 IV Push every 24 hours  dextrose 40% Gel 15 Oral once  dextrose 5% + sodium chloride 0.45%. 1000 IV Continuous <Continuous>  dextrose 5%. 1000 IV Continuous <Continuous>  dextrose 5%. 1000 IV Continuous <Continuous>  dextrose 50% Injectable 25 IV Push once  dextrose 50% Injectable 12.5 IV Push once  dextrose 50% Injectable 25 IV Push once  enoxaparin Injectable 40 SubCutaneous two times a day  glucagon  Injectable 1 IntraMuscular once  insulin glargine Injectable (LANTUS) 15 SubCutaneous at bedtime  insulin lispro (ADMELOG) corrective regimen sliding scale  SubCutaneous three times a day before meals  insulin lispro Injectable (ADMELOG) 5 SubCutaneous three times a day before meals  insulin regular Infusion 7 IV Continuous <Continuous>  levoFLOXacin IVPB     potassium chloride  20 mEq/100 mL IVPB 20 IV Intermittent once      Weight  Weight (kg): 85 (04-02-21 @ 23:30)    ANTIBIOTICS:  cefTRIAXone   IVPB      levoFLOXacin IVPB          ALLERGIES:  No Known Allergies

## 2021-04-03 NOTE — PROGRESS NOTE ADULT - ASSESSMENT
Impression     - Acute hypoxemic resp failure worsening status on 100% NRM  - highly suspect covid pneumonia ( severe)  - possible bacterial superinfection   - DKA still on insulin drip    Plan     1.CNS: avoid CNS depressant    2. CVS I= O, CE, echo    3. PULMONARY HOB 45, HHFNC/ BIPAP keep Sao2 88 to 94%, Decadron    4. INFECTIOUS DISEASE panculture , ID eval, repeat swab for covid, trend markers, abx    5. GI prophylaxis, po when on HHFNC    6. RENAL and acid base f/u lytes and correct    7. Endocrine FS DKA protocol, insulin per protocol, serial CMP    8. Hematology f/u cbc    9. DVT prophylaxis q 12h lovenox, LE doppler   MICU

## 2021-04-03 NOTE — ED ADULT NURSE REASSESSMENT NOTE - NS ED NURSE REASSESS COMMENT FT1
pt refusing mattress alarm at this time. pt educated on risks of refusing mattress alarm by RN. cardiac monitoring in progress. will continue to monitor

## 2021-04-04 LAB
ALBUMIN SERPL ELPH-MCNC: 3.1 G/DL — LOW (ref 3.5–5.2)
ALP SERPL-CCNC: 127 U/L — HIGH (ref 30–115)
ALT FLD-CCNC: 15 U/L — SIGNIFICANT CHANGE UP (ref 0–41)
ANION GAP SERPL CALC-SCNC: 12 MMOL/L — SIGNIFICANT CHANGE UP (ref 7–14)
ANION GAP SERPL CALC-SCNC: 16 MMOL/L — HIGH (ref 7–14)
AST SERPL-CCNC: 30 U/L — SIGNIFICANT CHANGE UP (ref 0–41)
B-OH-BUTYR SERPL-SCNC: 2.2 MMOL/L — HIGH
BASOPHILS # BLD AUTO: 0.01 K/UL — SIGNIFICANT CHANGE UP (ref 0–0.2)
BASOPHILS NFR BLD AUTO: 0.1 % — SIGNIFICANT CHANGE UP (ref 0–1)
BILIRUB SERPL-MCNC: 0.3 MG/DL — SIGNIFICANT CHANGE UP (ref 0.2–1.2)
BUN SERPL-MCNC: 11 MG/DL — SIGNIFICANT CHANGE UP (ref 10–20)
BUN SERPL-MCNC: 12 MG/DL — SIGNIFICANT CHANGE UP (ref 10–20)
C PEPTIDE SERPL-MCNC: 1.8 NG/ML — SIGNIFICANT CHANGE UP (ref 1.1–4.4)
CALCIUM SERPL-MCNC: 8.4 MG/DL — LOW (ref 8.5–10.1)
CALCIUM SERPL-MCNC: 8.4 MG/DL — LOW (ref 8.5–10.1)
CHLORIDE SERPL-SCNC: 104 MMOL/L — SIGNIFICANT CHANGE UP (ref 98–110)
CHLORIDE SERPL-SCNC: 107 MMOL/L — SIGNIFICANT CHANGE UP (ref 98–110)
CO2 SERPL-SCNC: 19 MMOL/L — SIGNIFICANT CHANGE UP (ref 17–32)
CO2 SERPL-SCNC: 22 MMOL/L — SIGNIFICANT CHANGE UP (ref 17–32)
CREAT SERPL-MCNC: 0.5 MG/DL — LOW (ref 0.7–1.5)
CREAT SERPL-MCNC: 0.5 MG/DL — LOW (ref 0.7–1.5)
EOSINOPHIL # BLD AUTO: 0 K/UL — SIGNIFICANT CHANGE UP (ref 0–0.7)
EOSINOPHIL NFR BLD AUTO: 0 % — SIGNIFICANT CHANGE UP (ref 0–8)
FERRITIN SERPL-MCNC: 315 NG/ML — HIGH (ref 15–150)
GLUCOSE BLDC GLUCOMTR-MCNC: 130 MG/DL — HIGH (ref 70–99)
GLUCOSE BLDC GLUCOMTR-MCNC: 173 MG/DL — HIGH (ref 70–99)
GLUCOSE BLDC GLUCOMTR-MCNC: 185 MG/DL — HIGH (ref 70–99)
GLUCOSE BLDC GLUCOMTR-MCNC: 190 MG/DL — HIGH (ref 70–99)
GLUCOSE BLDC GLUCOMTR-MCNC: 202 MG/DL — HIGH (ref 70–99)
GLUCOSE BLDC GLUCOMTR-MCNC: 207 MG/DL — HIGH (ref 70–99)
GLUCOSE BLDC GLUCOMTR-MCNC: 209 MG/DL — HIGH (ref 70–99)
GLUCOSE BLDC GLUCOMTR-MCNC: 296 MG/DL — HIGH (ref 70–99)
GLUCOSE SERPL-MCNC: 152 MG/DL — HIGH (ref 70–99)
GLUCOSE SERPL-MCNC: 240 MG/DL — HIGH (ref 70–99)
HCT VFR BLD CALC: 37.9 % — SIGNIFICANT CHANGE UP (ref 37–47)
HGB BLD-MCNC: 12.7 G/DL — SIGNIFICANT CHANGE UP (ref 12–16)
IMM GRANULOCYTES NFR BLD AUTO: 0.5 % — HIGH (ref 0.1–0.3)
LYMPHOCYTES # BLD AUTO: 0.68 K/UL — LOW (ref 1.2–3.4)
LYMPHOCYTES # BLD AUTO: 7.9 % — LOW (ref 20.5–51.1)
MAGNESIUM SERPL-MCNC: 1.9 MG/DL — SIGNIFICANT CHANGE UP (ref 1.8–2.4)
MCHC RBC-ENTMCNC: 24.1 PG — LOW (ref 27–31)
MCHC RBC-ENTMCNC: 33.5 G/DL — SIGNIFICANT CHANGE UP (ref 32–37)
MCV RBC AUTO: 71.9 FL — LOW (ref 81–99)
MONOCYTES # BLD AUTO: 0.62 K/UL — HIGH (ref 0.1–0.6)
MONOCYTES NFR BLD AUTO: 7.2 % — SIGNIFICANT CHANGE UP (ref 1.7–9.3)
NEUTROPHILS # BLD AUTO: 7.23 K/UL — HIGH (ref 1.4–6.5)
NEUTROPHILS NFR BLD AUTO: 84.3 % — HIGH (ref 42.2–75.2)
NRBC # BLD: 0 /100 WBCS — SIGNIFICANT CHANGE UP (ref 0–0)
PLATELET # BLD AUTO: 293 K/UL — SIGNIFICANT CHANGE UP (ref 130–400)
POTASSIUM SERPL-MCNC: 3.4 MMOL/L — LOW (ref 3.5–5)
POTASSIUM SERPL-MCNC: 3.7 MMOL/L — SIGNIFICANT CHANGE UP (ref 3.5–5)
POTASSIUM SERPL-SCNC: 3.4 MMOL/L — LOW (ref 3.5–5)
POTASSIUM SERPL-SCNC: 3.7 MMOL/L — SIGNIFICANT CHANGE UP (ref 3.5–5)
PROT SERPL-MCNC: 6.2 G/DL — SIGNIFICANT CHANGE UP (ref 6–8)
RBC # BLD: 5.27 M/UL — SIGNIFICANT CHANGE UP (ref 4.2–5.4)
RBC # FLD: 14 % — SIGNIFICANT CHANGE UP (ref 11.5–14.5)
SODIUM SERPL-SCNC: 139 MMOL/L — SIGNIFICANT CHANGE UP (ref 135–146)
SODIUM SERPL-SCNC: 141 MMOL/L — SIGNIFICANT CHANGE UP (ref 135–146)
WBC # BLD: 8.58 K/UL — SIGNIFICANT CHANGE UP (ref 4.8–10.8)
WBC # FLD AUTO: 8.58 K/UL — SIGNIFICANT CHANGE UP (ref 4.8–10.8)

## 2021-04-04 PROCEDURE — 71045 X-RAY EXAM CHEST 1 VIEW: CPT | Mod: 26

## 2021-04-04 PROCEDURE — 99232 SBSQ HOSP IP/OBS MODERATE 35: CPT

## 2021-04-04 PROCEDURE — 99231 SBSQ HOSP IP/OBS SF/LOW 25: CPT

## 2021-04-04 RX ORDER — TOCILIZUMAB 20 MG/ML
600 INJECTION, SOLUTION, CONCENTRATE INTRAVENOUS ONCE
Refills: 0 | Status: COMPLETED | OUTPATIENT
Start: 2021-04-04 | End: 2021-04-04

## 2021-04-04 RX ORDER — INSULIN LISPRO 100/ML
10 VIAL (ML) SUBCUTANEOUS
Refills: 0 | Status: DISCONTINUED | OUTPATIENT
Start: 2021-04-04 | End: 2021-04-09

## 2021-04-04 RX ORDER — INSULIN GLARGINE 100 [IU]/ML
30 INJECTION, SOLUTION SUBCUTANEOUS ONCE
Refills: 0 | Status: COMPLETED | OUTPATIENT
Start: 2021-04-04 | End: 2021-04-04

## 2021-04-04 RX ORDER — ALPRAZOLAM 0.25 MG
0.5 TABLET ORAL ONCE
Refills: 0 | Status: DISCONTINUED | OUTPATIENT
Start: 2021-04-04 | End: 2021-04-04

## 2021-04-04 RX ORDER — POTASSIUM CHLORIDE 20 MEQ
20 PACKET (EA) ORAL ONCE
Refills: 0 | Status: COMPLETED | OUTPATIENT
Start: 2021-04-04 | End: 2021-04-04

## 2021-04-04 RX ORDER — INSULIN GLARGINE 100 [IU]/ML
30 INJECTION, SOLUTION SUBCUTANEOUS AT BEDTIME
Refills: 0 | Status: DISCONTINUED | OUTPATIENT
Start: 2021-04-05 | End: 2021-04-09

## 2021-04-04 RX ORDER — ALPRAZOLAM 0.25 MG
0.25 TABLET ORAL ONCE
Refills: 0 | Status: DISCONTINUED | OUTPATIENT
Start: 2021-04-04 | End: 2021-04-04

## 2021-04-04 RX ORDER — INSULIN LISPRO 100/ML
VIAL (ML) SUBCUTANEOUS
Refills: 0 | Status: DISCONTINUED | OUTPATIENT
Start: 2021-04-04 | End: 2021-04-09

## 2021-04-04 RX ADMIN — CEFTRIAXONE 100 MILLIGRAM(S): 500 INJECTION, POWDER, FOR SOLUTION INTRAMUSCULAR; INTRAVENOUS at 05:29

## 2021-04-04 RX ADMIN — SODIUM CHLORIDE 75 MILLILITER(S): 9 INJECTION, SOLUTION INTRAVENOUS at 15:43

## 2021-04-04 RX ADMIN — ENOXAPARIN SODIUM 40 MILLIGRAM(S): 100 INJECTION SUBCUTANEOUS at 17:33

## 2021-04-04 RX ADMIN — Medication 0.25 MILLIGRAM(S): at 22:53

## 2021-04-04 RX ADMIN — Medication 50 MILLIEQUIVALENT(S): at 05:30

## 2021-04-04 RX ADMIN — Medication 6 MILLIGRAM(S): at 05:29

## 2021-04-04 RX ADMIN — ENOXAPARIN SODIUM 40 MILLIGRAM(S): 100 INJECTION SUBCUTANEOUS at 05:20

## 2021-04-04 RX ADMIN — TOCILIZUMAB 100 MILLIGRAM(S): 20 INJECTION, SOLUTION, CONCENTRATE INTRAVENOUS at 15:30

## 2021-04-04 RX ADMIN — Medication 10 UNIT(S): at 18:09

## 2021-04-04 RX ADMIN — ONDANSETRON 4 MILLIGRAM(S): 8 TABLET, FILM COATED ORAL at 09:55

## 2021-04-04 RX ADMIN — Medication 0.5 MILLIGRAM(S): at 15:29

## 2021-04-04 RX ADMIN — INSULIN GLARGINE 30 UNIT(S): 100 INJECTION, SOLUTION SUBCUTANEOUS at 15:42

## 2021-04-04 RX ADMIN — Medication 3: at 18:10

## 2021-04-04 NOTE — PROGRESS NOTE ADULT - ASSESSMENT
Impression     - Acute hypoxemic resp failure worsening status on 100% NRM  - very highly suspect covid pneumonia (severe)  - possible bacterial superinfection   - DKA still on insulin drip    Plan     1.CNS: avoid CNS depressant    2. CVS I= O, CE, echo    3. PULMONARY HOB 45,   HHFNC/ BIPAP keep Sao2 88 to 94%,   Decadron 6mg daily    4. INFECTIOUS DISEASE panculture ,   ID f/u,  repeat swab for covid PCR,  trend markers,   broad spectrum abx cover GNr    5. GI prophylaxis,   Npo when on NIV  can eat on HFNC    6. RENAL and acid base f/u lytes and correct    7. Endocrine FS DKA protocol, insulin per protocol, serial CMP    8. Hematology f/u cbc    9. DVT prophylaxis q 12h lovenox,   LE doppler   MICU

## 2021-04-04 NOTE — PROGRESS NOTE ADULT - ASSESSMENT
DM2, mild DKA- resolved, possible covid pneumonia on steroids and abx   RECS:  - Glargine 30 units now, bridge with drip for 3-4 hours.   - Lispro 10 units AC with correct scale(sliding scale)  - C-peptide levels noted- 1.8   - Upon d/c- glargine + metformin 1 g BID and Ozempic 0.25 mg/weekly and after which increase to 0.5mg/weekly.

## 2021-04-05 LAB
ALBUMIN SERPL ELPH-MCNC: 3.2 G/DL — LOW (ref 3.5–5.2)
ALP SERPL-CCNC: 123 U/L — HIGH (ref 30–115)
ALT FLD-CCNC: 15 U/L — SIGNIFICANT CHANGE UP (ref 0–41)
ANION GAP SERPL CALC-SCNC: 12 MMOL/L — SIGNIFICANT CHANGE UP (ref 7–14)
AST SERPL-CCNC: 21 U/L — SIGNIFICANT CHANGE UP (ref 0–41)
BASOPHILS # BLD AUTO: 0.01 K/UL — SIGNIFICANT CHANGE UP (ref 0–0.2)
BASOPHILS NFR BLD AUTO: 0.1 % — SIGNIFICANT CHANGE UP (ref 0–1)
BILIRUB SERPL-MCNC: 0.3 MG/DL — SIGNIFICANT CHANGE UP (ref 0.2–1.2)
BUN SERPL-MCNC: 17 MG/DL — SIGNIFICANT CHANGE UP (ref 10–20)
CALCIUM SERPL-MCNC: 8.5 MG/DL — SIGNIFICANT CHANGE UP (ref 8.5–10.1)
CHLORIDE SERPL-SCNC: 106 MMOL/L — SIGNIFICANT CHANGE UP (ref 98–110)
CO2 SERPL-SCNC: 24 MMOL/L — SIGNIFICANT CHANGE UP (ref 17–32)
COVID-19 NUCLEOCAPSID GAM AB INTERP: POSITIVE
COVID-19 NUCLEOCAPSID TOTAL GAM ANTIBODY RESULT: 1.41 INDEX — HIGH
CREAT SERPL-MCNC: 0.5 MG/DL — LOW (ref 0.7–1.5)
D DIMER BLD IA.RAPID-MCNC: 94 NG/ML DDU — SIGNIFICANT CHANGE UP (ref 0–230)
EOSINOPHIL # BLD AUTO: 0 K/UL — SIGNIFICANT CHANGE UP (ref 0–0.7)
EOSINOPHIL NFR BLD AUTO: 0 % — SIGNIFICANT CHANGE UP (ref 0–8)
GLUCOSE BLDC GLUCOMTR-MCNC: 114 MG/DL — HIGH (ref 70–99)
GLUCOSE BLDC GLUCOMTR-MCNC: 122 MG/DL — HIGH (ref 70–99)
GLUCOSE BLDC GLUCOMTR-MCNC: 122 MG/DL — HIGH (ref 70–99)
GLUCOSE BLDC GLUCOMTR-MCNC: 142 MG/DL — HIGH (ref 70–99)
GLUCOSE BLDC GLUCOMTR-MCNC: 144 MG/DL — HIGH (ref 70–99)
GLUCOSE BLDC GLUCOMTR-MCNC: 156 MG/DL — HIGH (ref 70–99)
GLUCOSE BLDC GLUCOMTR-MCNC: 159 MG/DL — HIGH (ref 70–99)
GLUCOSE BLDC GLUCOMTR-MCNC: 192 MG/DL — HIGH (ref 70–99)
GLUCOSE BLDC GLUCOMTR-MCNC: 194 MG/DL — HIGH (ref 70–99)
GLUCOSE BLDC GLUCOMTR-MCNC: 209 MG/DL — HIGH (ref 70–99)
GLUCOSE BLDC GLUCOMTR-MCNC: 212 MG/DL — HIGH (ref 70–99)
GLUCOSE SERPL-MCNC: 175 MG/DL — HIGH (ref 70–99)
HCT VFR BLD CALC: 40.3 % — SIGNIFICANT CHANGE UP (ref 37–47)
HGB BLD-MCNC: 12.9 G/DL — SIGNIFICANT CHANGE UP (ref 12–16)
HIV 1+2 AB+HIV1 P24 AG SERPL QL IA: SIGNIFICANT CHANGE UP
IMM GRANULOCYTES NFR BLD AUTO: 0.8 % — HIGH (ref 0.1–0.3)
LEGIONELLA AG UR QL: NEGATIVE — SIGNIFICANT CHANGE UP
LYMPHOCYTES # BLD AUTO: 1.28 K/UL — SIGNIFICANT CHANGE UP (ref 1.2–3.4)
LYMPHOCYTES # BLD AUTO: 15.5 % — LOW (ref 20.5–51.1)
MAGNESIUM SERPL-MCNC: 1.9 MG/DL — SIGNIFICANT CHANGE UP (ref 1.8–2.4)
MCHC RBC-ENTMCNC: 24.2 PG — LOW (ref 27–31)
MCHC RBC-ENTMCNC: 32 G/DL — SIGNIFICANT CHANGE UP (ref 32–37)
MCV RBC AUTO: 75.6 FL — LOW (ref 81–99)
MONOCYTES # BLD AUTO: 0.76 K/UL — HIGH (ref 0.1–0.6)
MONOCYTES NFR BLD AUTO: 9.2 % — SIGNIFICANT CHANGE UP (ref 1.7–9.3)
NEUTROPHILS # BLD AUTO: 6.14 K/UL — SIGNIFICANT CHANGE UP (ref 1.4–6.5)
NEUTROPHILS NFR BLD AUTO: 74.4 % — SIGNIFICANT CHANGE UP (ref 42.2–75.2)
NRBC # BLD: 0 /100 WBCS — SIGNIFICANT CHANGE UP (ref 0–0)
PLATELET # BLD AUTO: 371 K/UL — SIGNIFICANT CHANGE UP (ref 130–400)
POTASSIUM SERPL-MCNC: 3.5 MMOL/L — SIGNIFICANT CHANGE UP (ref 3.5–5)
POTASSIUM SERPL-SCNC: 3.5 MMOL/L — SIGNIFICANT CHANGE UP (ref 3.5–5)
PROCALCITONIN SERPL-MCNC: 0.1 NG/ML — SIGNIFICANT CHANGE UP (ref 0.02–0.1)
PROT SERPL-MCNC: 6.2 G/DL — SIGNIFICANT CHANGE UP (ref 6–8)
RBC # BLD: 5.33 M/UL — SIGNIFICANT CHANGE UP (ref 4.2–5.4)
RBC # FLD: 14.4 % — SIGNIFICANT CHANGE UP (ref 11.5–14.5)
SARS-COV-2 IGG+IGM SERPL QL IA: 1.41 INDEX — HIGH
SARS-COV-2 IGG+IGM SERPL QL IA: POSITIVE
SODIUM SERPL-SCNC: 142 MMOL/L — SIGNIFICANT CHANGE UP (ref 135–146)
WBC # BLD: 8.26 K/UL — SIGNIFICANT CHANGE UP (ref 4.8–10.8)
WBC # FLD AUTO: 8.26 K/UL — SIGNIFICANT CHANGE UP (ref 4.8–10.8)

## 2021-04-05 PROCEDURE — 93010 ELECTROCARDIOGRAM REPORT: CPT

## 2021-04-05 PROCEDURE — 93306 TTE W/DOPPLER COMPLETE: CPT | Mod: 26

## 2021-04-05 PROCEDURE — 71045 X-RAY EXAM CHEST 1 VIEW: CPT | Mod: 26

## 2021-04-05 PROCEDURE — 99233 SBSQ HOSP IP/OBS HIGH 50: CPT

## 2021-04-05 RX ORDER — ALPRAZOLAM 0.25 MG
0.25 TABLET ORAL DAILY
Refills: 0 | Status: DISCONTINUED | OUTPATIENT
Start: 2021-04-05 | End: 2021-04-07

## 2021-04-05 RX ORDER — PANTOPRAZOLE SODIUM 20 MG/1
40 TABLET, DELAYED RELEASE ORAL
Refills: 0 | Status: DISCONTINUED | OUTPATIENT
Start: 2021-04-05 | End: 2021-04-09

## 2021-04-05 RX ADMIN — Medication 2: at 11:44

## 2021-04-05 RX ADMIN — Medication 10 UNIT(S): at 11:43

## 2021-04-05 RX ADMIN — Medication 6 MILLIGRAM(S): at 05:31

## 2021-04-05 RX ADMIN — ENOXAPARIN SODIUM 40 MILLIGRAM(S): 100 INJECTION SUBCUTANEOUS at 17:00

## 2021-04-05 RX ADMIN — CEFTRIAXONE 100 MILLIGRAM(S): 500 INJECTION, POWDER, FOR SOLUTION INTRAMUSCULAR; INTRAVENOUS at 05:30

## 2021-04-05 RX ADMIN — INSULIN GLARGINE 30 UNIT(S): 100 INJECTION, SOLUTION SUBCUTANEOUS at 21:32

## 2021-04-05 RX ADMIN — Medication 0.25 MILLIGRAM(S): at 22:11

## 2021-04-05 RX ADMIN — Medication 1: at 16:47

## 2021-04-05 RX ADMIN — Medication 10 UNIT(S): at 08:14

## 2021-04-05 RX ADMIN — Medication 10 UNIT(S): at 16:47

## 2021-04-05 RX ADMIN — PANTOPRAZOLE SODIUM 40 MILLIGRAM(S): 20 TABLET, DELAYED RELEASE ORAL at 16:40

## 2021-04-05 RX ADMIN — ENOXAPARIN SODIUM 40 MILLIGRAM(S): 100 INJECTION SUBCUTANEOUS at 05:06

## 2021-04-05 RX ADMIN — SODIUM CHLORIDE 75 MILLILITER(S): 9 INJECTION, SOLUTION INTRAVENOUS at 05:45

## 2021-04-05 NOTE — PHYSICAL THERAPY INITIAL EVALUATION ADULT - ASSISTIVE DEVICE FOR TRANSFER: STAND/SIT, REHAB EVAL
[facility] AND [facility]  [facility] Cardiology Progress Note    Kerri Pizarro Patient Status:  Inpatient    1957 MRN [id number]   Location [facility] Attending Wade Randle Day # 2 PCP No primary care provider on file.      58 Net 480 ml       Physical Exam:     General: Alert and oriented x 3. No apparent distress. No respiratory or constitutional distress. HEENT: Normocephalic, anicteric sclera, neck supple. Neck: No JVD, carotids 2+, no bruits.   Cardiac: Regular rate and no AD

## 2021-04-05 NOTE — PROGRESS NOTE ADULT - ASSESSMENT
ASSESSMENT  36 y/o F with no PMH now presenting to ED with 4 day h/o SOB and cough.    IMPRESSION  #DKA  #Pneumonia, community acqured vs COVID pneumonia  - CT Angio Chest PE Protocol w/ IV Cont (04.02.21 @ 11:46): No central pulmonary embolism. Patchy bilateral consolidative opacities, likely due to multifocal pneumonia. Follow-up to resolution is recommended. Mild mediastinal lymphadenopathy. nd of copied text >  - COVID negative PCR x 2  - COVID ab positive  - D-Dimer Assay, Quantitative: 213: Manufacturers recommended Cut off for VTE is 230 ng/ml D-DU ng/mL DDU (04.03.21 @ 11:30)  - C-Reactive Protein, Serum: 182: Please note: Reference range has changed due to units of measure change. mg/L (04.03.21 @ 04:30)  - Procalcitonin, Serum: 0.09 (04.02.21 @ 00:09)    #Abx allergy: NKDA    RECOMMENDATIONS  - follow-up urine strep and urine legionella  - follow-up procalcitonin  - if procalcitonin remains <0.25, would stop antibiotics altogether  - follow-up HIV  - if repeat procalcitonin is low, please call to discuss tocilizumab 8 mg/kg x 1 for severe COVID    Please call or message on [employer] Teams if with any questions.  Spectra [phone]   ASSESSMENT  36 y/o F with no PMH now presenting to ED with 4 day h/o SOB and cough.    IMPRESSION  #DKA  #Pneumonia, community acqured vs COVID pneumonia  - CT Angio Chest PE Protocol w/ IV Cont (04.02.21 @ 11:46): No central pulmonary embolism. Patchy bilateral consolidative opacities, likely due to multifocal pneumonia. Follow-up to resolution is recommended. Mild mediastinal lymphadenopathy. nd of copied text >  - COVID negative PCR x 2  - COVID ab positive  - D-Dimer Assay, Quantitative: 213: Manufacturers recommended Cut off for VTE is 230 ng/ml D-DU ng/mL DDU (04.03.21 @ 11:30)  - C-Reactive Protein, Serum: 182: Please note: Reference range has changed due to units of measure change. mg/L (04.03.21 @ 04:30)  - Procalcitonin, Serum: 0.09 (04.02.21 @ 00:09)  - s/p Toci 4/4    #Abx allergy: NKDA    RECOMMENDATIONS  - s/p tocilizumab 4/4  - can stop antibiotics as procalcitonin remains negative  - follow-up HIV  - continue dex 6 mg daily    Please call or message on Microsoft Teams if with any questions.  Spectra [phone]

## 2021-04-05 NOTE — PHYSICAL THERAPY INITIAL EVALUATION ADULT - GAIT TRAINING, PT EVAL
pt will ambulate independently 150ft with no Assistive Device and progress to stair training if appropriate upon D/C

## 2021-04-05 NOTE — PROGRESS NOTE ADULT - ASSESSMENT
IMPRESSION:   Acute Hypoxemic Respiratory Failure on 70%  Possible severe COVID PNA, negative PCR x 2  SP Toci 4/4  Possible Bacterial superinfection  DKA, resolved  RBBB      SUGGEST:    CNS: Avoid CNS depressant    HEENT: Oral Care    PULMONARY: HOB at 45 degrees.  Aspiration precautions.  c/w HHFNC, alternate with NIV.  NIV HS.  Target POx 92-96%.   Encourage Incentive spirometry.  c/w Dexamethasone 6mg qd D#3    CARDIOVASCULAR:  ECHO. CE x 2.  Keep I = O.  Repeat EKG.    GI: GI ppx.  Feeding as tolerated.  Bowel regimen.    RENAL:  FU CMP.  FU lytes.  Correct as needed.  Monitor UO.    INFECTIOUS:  Ceftriaxone & Levofloxacin.  MRSA nares.  Urine Strep & Legionella Ag.  Procalcitonin, d/c ABX if negative.  FU cultures.  Repeat COVID swab PCR tomorrow.  ID FU.  Trend inflammatory markers    HEMATOLOGY:  DVT prophylaxis.  FU CBC.  VDUS negative 4/3    ENDOCRINOLOGY:  FU FS.  Insulin protocol    MUSCULOSKELETAL: bed in chair, PT/OT    MICU monitoring

## 2021-04-05 NOTE — PHYSICAL THERAPY INITIAL EVALUATION ADULT - PERTINENT HX OF CURRENT PROBLEM, REHAB EVAL
36 y/o F with no PMH now presenting to ED with 4 day h/o SOB and cough. Pt went to urgent care to get rapid swab for COVID which came back negative. Now presented to ED with worsening symptoms. In the ED pt was found to have DKA along with PNA on Xray. Pt was given IV insulin along with antibiotics and called to MICU

## 2021-04-06 LAB
ALBUMIN SERPL ELPH-MCNC: 2.9 G/DL — LOW (ref 3.5–5.2)
ALP SERPL-CCNC: 103 U/L — SIGNIFICANT CHANGE UP (ref 30–115)
ALT FLD-CCNC: 14 U/L — SIGNIFICANT CHANGE UP (ref 0–41)
ANA TITR SER: NEGATIVE — SIGNIFICANT CHANGE UP
ANION GAP SERPL CALC-SCNC: 8 MMOL/L — SIGNIFICANT CHANGE UP (ref 7–14)
ANISOCYTOSIS BLD QL: SLIGHT — SIGNIFICANT CHANGE UP
AST SERPL-CCNC: 19 U/L — SIGNIFICANT CHANGE UP (ref 0–41)
BASOPHILS # BLD AUTO: 0 K/UL — SIGNIFICANT CHANGE UP (ref 0–0.2)
BASOPHILS NFR BLD AUTO: 0 % — SIGNIFICANT CHANGE UP (ref 0–1)
BILIRUB SERPL-MCNC: 0.4 MG/DL — SIGNIFICANT CHANGE UP (ref 0.2–1.2)
BUN SERPL-MCNC: 15 MG/DL — SIGNIFICANT CHANGE UP (ref 10–20)
CALCIUM SERPL-MCNC: 8.1 MG/DL — LOW (ref 8.5–10.1)
CHLORIDE SERPL-SCNC: 108 MMOL/L — SIGNIFICANT CHANGE UP (ref 98–110)
CO2 SERPL-SCNC: 26 MMOL/L — SIGNIFICANT CHANGE UP (ref 17–32)
CREAT SERPL-MCNC: <0.5 MG/DL — LOW (ref 0.7–1.5)
CRP SERPL-MCNC: 57 MG/L — HIGH
DSDNA AB SER-ACNC: <12 IU/ML — SIGNIFICANT CHANGE UP
EOSINOPHIL # BLD AUTO: 0.06 K/UL — SIGNIFICANT CHANGE UP (ref 0–0.7)
EOSINOPHIL NFR BLD AUTO: 0.9 % — SIGNIFICANT CHANGE UP (ref 0–8)
FERRITIN SERPL-MCNC: 338 NG/ML — HIGH (ref 15–150)
GIANT PLATELETS BLD QL SMEAR: PRESENT — SIGNIFICANT CHANGE UP
GLUCOSE BLDC GLUCOMTR-MCNC: 104 MG/DL — HIGH (ref 70–99)
GLUCOSE BLDC GLUCOMTR-MCNC: 141 MG/DL — HIGH (ref 70–99)
GLUCOSE BLDC GLUCOMTR-MCNC: 186 MG/DL — HIGH (ref 70–99)
GLUCOSE BLDC GLUCOMTR-MCNC: 255 MG/DL — HIGH (ref 70–99)
GLUCOSE SERPL-MCNC: 74 MG/DL — SIGNIFICANT CHANGE UP (ref 70–99)
HCT VFR BLD CALC: 37.8 % — SIGNIFICANT CHANGE UP (ref 37–47)
HGB BLD-MCNC: 11.9 G/DL — LOW (ref 12–16)
INR BLD: 1.17 RATIO — SIGNIFICANT CHANGE UP (ref 0.65–1.3)
LEGIONELLA AG UR QL: NEGATIVE — SIGNIFICANT CHANGE UP
LYMPHOCYTES # BLD AUTO: 1.05 K/UL — LOW (ref 1.2–3.4)
LYMPHOCYTES # BLD AUTO: 16.8 % — LOW (ref 20.5–51.1)
MAGNESIUM SERPL-MCNC: 1.9 MG/DL — SIGNIFICANT CHANGE UP (ref 1.8–2.4)
MANUAL SMEAR VERIFICATION: SIGNIFICANT CHANGE UP
MCHC RBC-ENTMCNC: 23.8 PG — LOW (ref 27–31)
MCHC RBC-ENTMCNC: 31.5 G/DL — LOW (ref 32–37)
MCV RBC AUTO: 75.6 FL — LOW (ref 81–99)
MICROCYTES BLD QL: SLIGHT — SIGNIFICANT CHANGE UP
MONOCYTES # BLD AUTO: 0.5 K/UL — SIGNIFICANT CHANGE UP (ref 0.1–0.6)
MONOCYTES NFR BLD AUTO: 8 % — SIGNIFICANT CHANGE UP (ref 1.7–9.3)
NEUTROPHILS # BLD AUTO: 4.43 K/UL — SIGNIFICANT CHANGE UP (ref 1.4–6.5)
NEUTROPHILS NFR BLD AUTO: 70.8 % — SIGNIFICANT CHANGE UP (ref 42.2–75.2)
PHOSPHATE SERPL-MCNC: 3.4 MG/DL — SIGNIFICANT CHANGE UP (ref 2.1–4.9)
PLAT MORPH BLD: NORMAL — SIGNIFICANT CHANGE UP
PLATELET # BLD AUTO: 330 K/UL — SIGNIFICANT CHANGE UP (ref 130–400)
POLYCHROMASIA BLD QL SMEAR: SIGNIFICANT CHANGE UP
POTASSIUM SERPL-MCNC: 3.2 MMOL/L — LOW (ref 3.5–5)
POTASSIUM SERPL-SCNC: 3.2 MMOL/L — LOW (ref 3.5–5)
PROT SERPL-MCNC: 5.4 G/DL — LOW (ref 6–8)
PROTHROM AB SERPL-ACNC: 13.4 SEC — HIGH (ref 9.95–12.87)
RBC # BLD: 5 M/UL — SIGNIFICANT CHANGE UP (ref 4.2–5.4)
RBC # FLD: 14.3 % — SIGNIFICANT CHANGE UP (ref 11.5–14.5)
RBC BLD AUTO: ABNORMAL
S PNEUM AG UR QL: NEGATIVE — SIGNIFICANT CHANGE UP
SARS-COV-2 RNA SPEC QL NAA+PROBE: SIGNIFICANT CHANGE UP
SMUDGE CELLS # BLD: PRESENT — SIGNIFICANT CHANGE UP
SODIUM SERPL-SCNC: 142 MMOL/L — SIGNIFICANT CHANGE UP (ref 135–146)
VARIANT LYMPHS # BLD: 3.5 % — SIGNIFICANT CHANGE UP (ref 0–5)
WBC # BLD: 6.26 K/UL — SIGNIFICANT CHANGE UP (ref 4.8–10.8)
WBC # FLD AUTO: 6.26 K/UL — SIGNIFICANT CHANGE UP (ref 4.8–10.8)

## 2021-04-06 PROCEDURE — 71045 X-RAY EXAM CHEST 1 VIEW: CPT | Mod: 26

## 2021-04-06 PROCEDURE — 99232 SBSQ HOSP IP/OBS MODERATE 35: CPT

## 2021-04-06 RX ORDER — POTASSIUM CHLORIDE 20 MEQ
40 PACKET (EA) ORAL EVERY 4 HOURS
Refills: 0 | Status: COMPLETED | OUTPATIENT
Start: 2021-04-06 | End: 2021-04-06

## 2021-04-06 RX ADMIN — Medication 10 UNIT(S): at 17:26

## 2021-04-06 RX ADMIN — ENOXAPARIN SODIUM 40 MILLIGRAM(S): 100 INJECTION SUBCUTANEOUS at 17:29

## 2021-04-06 RX ADMIN — SODIUM CHLORIDE 75 MILLILITER(S): 9 INJECTION, SOLUTION INTRAVENOUS at 05:52

## 2021-04-06 RX ADMIN — Medication 10 UNIT(S): at 08:36

## 2021-04-06 RX ADMIN — INSULIN GLARGINE 30 UNIT(S): 100 INJECTION, SOLUTION SUBCUTANEOUS at 22:10

## 2021-04-06 RX ADMIN — ENOXAPARIN SODIUM 40 MILLIGRAM(S): 100 INJECTION SUBCUTANEOUS at 05:51

## 2021-04-06 RX ADMIN — Medication 6 MILLIGRAM(S): at 05:52

## 2021-04-06 RX ADMIN — Medication 10 UNIT(S): at 13:05

## 2021-04-06 RX ADMIN — Medication 0.25 MILLIGRAM(S): at 22:10

## 2021-04-06 RX ADMIN — Medication 40 MILLIEQUIVALENT(S): at 13:04

## 2021-04-06 RX ADMIN — PANTOPRAZOLE SODIUM 40 MILLIGRAM(S): 20 TABLET, DELAYED RELEASE ORAL at 08:36

## 2021-04-06 RX ADMIN — Medication 1: at 17:26

## 2021-04-06 RX ADMIN — Medication 40 MILLIEQUIVALENT(S): at 15:14

## 2021-04-06 NOTE — CHART NOTE - NSCHARTNOTEFT_GEN_A_CORE
Called by nurse for vaginal bleeding. Patient noted blood with clots in her vaginal canal when she used the bathroom. She states she has soiled two pads so far. She denies dizziness and blood in stool or urine. Her LMP was 3/25 and usually does not have period bleeding at this time of month. BP is stable. Discontinued her lovenox and ordered CBC for 11pm.

## 2021-04-06 NOTE — PROGRESS NOTE ADULT - ASSESSMENT
IMPRESSION:   Acute Hypoxemic Respiratory Failure on 50%  Possible severe COVID PNA, negative PCR x 2  SP Toci 4/4  Less likely Bacterial superinfection  DKA, resolved  RBBB      SUGGEST:    CNS: Avoid CNS depressant    HEENT: Oral Care    PULMONARY: HOB at 45 degrees.  Aspiration precautions.  c/w HHFNC wean to NC as tolerated.  Target POx 88 - 96%.   Encourage Incentive spirometry.  c/w Dexamethasone 6mg qd D#4    CARDIOVASCULAR:  Keep I = O.       GI: GI ppx.  Feeding as tolerated.  Bowel regimen.    RENAL:  FU CMP.  FU lytes.  Correct as needed.  Monitor UO.    INFECTIOUS:  Monitor off ABX.  FU Procalcitonin.  FU cultures.  Repeat COVID swab PCR.  ID FU.  Trend inflammatory markers    HEMATOLOGY:  DVT prophylaxis.  Negative D-dimer.    ENDOCRINOLOGY:  FU FS.  Insulin protocol    MUSCULOSKELETAL: bed in chair, PT/OT    MICU for now, possible downgrade to SDU

## 2021-04-07 LAB
ALBUMIN SERPL ELPH-MCNC: 2.8 G/DL — LOW (ref 3.5–5.2)
ALP SERPL-CCNC: 89 U/L — SIGNIFICANT CHANGE UP (ref 30–115)
ALT FLD-CCNC: 19 U/L — SIGNIFICANT CHANGE UP (ref 0–41)
ANION GAP SERPL CALC-SCNC: 8 MMOL/L — SIGNIFICANT CHANGE UP (ref 7–14)
AST SERPL-CCNC: 27 U/L — SIGNIFICANT CHANGE UP (ref 0–41)
BASOPHILS # BLD AUTO: 0.01 K/UL — SIGNIFICANT CHANGE UP (ref 0–0.2)
BASOPHILS # BLD AUTO: 0.01 K/UL — SIGNIFICANT CHANGE UP (ref 0–0.2)
BASOPHILS NFR BLD AUTO: 0.1 % — SIGNIFICANT CHANGE UP (ref 0–1)
BASOPHILS NFR BLD AUTO: 0.2 % — SIGNIFICANT CHANGE UP (ref 0–1)
BILIRUB SERPL-MCNC: 0.3 MG/DL — SIGNIFICANT CHANGE UP (ref 0.2–1.2)
BUN SERPL-MCNC: 14 MG/DL — SIGNIFICANT CHANGE UP (ref 10–20)
CALCIUM SERPL-MCNC: 7.9 MG/DL — LOW (ref 8.5–10.1)
CHLORIDE SERPL-SCNC: 102 MMOL/L — SIGNIFICANT CHANGE UP (ref 98–110)
CO2 SERPL-SCNC: 28 MMOL/L — SIGNIFICANT CHANGE UP (ref 17–32)
CREAT SERPL-MCNC: <0.5 MG/DL — LOW (ref 0.7–1.5)
CULTURE RESULTS: SIGNIFICANT CHANGE UP
CULTURE RESULTS: SIGNIFICANT CHANGE UP
EOSINOPHIL # BLD AUTO: 0.01 K/UL — SIGNIFICANT CHANGE UP (ref 0–0.7)
EOSINOPHIL # BLD AUTO: 0.02 K/UL — SIGNIFICANT CHANGE UP (ref 0–0.7)
EOSINOPHIL NFR BLD AUTO: 0.1 % — SIGNIFICANT CHANGE UP (ref 0–8)
EOSINOPHIL NFR BLD AUTO: 0.3 % — SIGNIFICANT CHANGE UP (ref 0–8)
GLUCOSE BLDC GLUCOMTR-MCNC: 132 MG/DL — HIGH (ref 70–99)
GLUCOSE BLDC GLUCOMTR-MCNC: 201 MG/DL — HIGH (ref 70–99)
GLUCOSE BLDC GLUCOMTR-MCNC: 234 MG/DL — HIGH (ref 70–99)
GLUCOSE BLDC GLUCOMTR-MCNC: 251 MG/DL — HIGH (ref 70–99)
GLUCOSE SERPL-MCNC: 153 MG/DL — HIGH (ref 70–99)
HCT VFR BLD CALC: 37.4 % — SIGNIFICANT CHANGE UP (ref 37–47)
HCT VFR BLD CALC: 37.7 % — SIGNIFICANT CHANGE UP (ref 37–47)
HGB BLD-MCNC: 11.9 G/DL — LOW (ref 12–16)
HGB BLD-MCNC: 12.2 G/DL — SIGNIFICANT CHANGE UP (ref 12–16)
IMM GRANULOCYTES NFR BLD AUTO: 1.4 % — HIGH (ref 0.1–0.3)
IMM GRANULOCYTES NFR BLD AUTO: 1.4 % — HIGH (ref 0.1–0.3)
LYMPHOCYTES # BLD AUTO: 1.46 K/UL — SIGNIFICANT CHANGE UP (ref 1.2–3.4)
LYMPHOCYTES # BLD AUTO: 1.65 K/UL — SIGNIFICANT CHANGE UP (ref 1.2–3.4)
LYMPHOCYTES # BLD AUTO: 21.1 % — SIGNIFICANT CHANGE UP (ref 20.5–51.1)
LYMPHOCYTES # BLD AUTO: 27.9 % — SIGNIFICANT CHANGE UP (ref 20.5–51.1)
MCHC RBC-ENTMCNC: 24 PG — LOW (ref 27–31)
MCHC RBC-ENTMCNC: 24 PG — LOW (ref 27–31)
MCHC RBC-ENTMCNC: 31.8 G/DL — LOW (ref 32–37)
MCHC RBC-ENTMCNC: 32.4 G/DL — SIGNIFICANT CHANGE UP (ref 32–37)
MCV RBC AUTO: 74.2 FL — LOW (ref 81–99)
MCV RBC AUTO: 75.6 FL — LOW (ref 81–99)
MONOCYTES # BLD AUTO: 0.65 K/UL — HIGH (ref 0.1–0.6)
MONOCYTES # BLD AUTO: 0.74 K/UL — HIGH (ref 0.1–0.6)
MONOCYTES NFR BLD AUTO: 10.7 % — HIGH (ref 1.7–9.3)
MONOCYTES NFR BLD AUTO: 11 % — HIGH (ref 1.7–9.3)
NEUTROPHILS # BLD AUTO: 3.5 K/UL — SIGNIFICANT CHANGE UP (ref 1.4–6.5)
NEUTROPHILS # BLD AUTO: 4.59 K/UL — SIGNIFICANT CHANGE UP (ref 1.4–6.5)
NEUTROPHILS NFR BLD AUTO: 59.2 % — SIGNIFICANT CHANGE UP (ref 42.2–75.2)
NEUTROPHILS NFR BLD AUTO: 66.6 % — SIGNIFICANT CHANGE UP (ref 42.2–75.2)
NRBC # BLD: 0 /100 WBCS — SIGNIFICANT CHANGE UP (ref 0–0)
NRBC # BLD: 0 /100 WBCS — SIGNIFICANT CHANGE UP (ref 0–0)
PLATELET # BLD AUTO: 320 K/UL — SIGNIFICANT CHANGE UP (ref 130–400)
PLATELET # BLD AUTO: 333 K/UL — SIGNIFICANT CHANGE UP (ref 130–400)
POTASSIUM SERPL-MCNC: 3.2 MMOL/L — LOW (ref 3.5–5)
POTASSIUM SERPL-SCNC: 3.2 MMOL/L — LOW (ref 3.5–5)
PROCALCITONIN SERPL-MCNC: 0.05 NG/ML — SIGNIFICANT CHANGE UP (ref 0.02–0.1)
PROT SERPL-MCNC: 5.4 G/DL — LOW (ref 6–8)
RBC # BLD: 4.95 M/UL — SIGNIFICANT CHANGE UP (ref 4.2–5.4)
RBC # BLD: 5.08 M/UL — SIGNIFICANT CHANGE UP (ref 4.2–5.4)
RBC # FLD: 14 % — SIGNIFICANT CHANGE UP (ref 11.5–14.5)
RBC # FLD: 14.2 % — SIGNIFICANT CHANGE UP (ref 11.5–14.5)
SODIUM SERPL-SCNC: 138 MMOL/L — SIGNIFICANT CHANGE UP (ref 135–146)
SPECIMEN SOURCE: SIGNIFICANT CHANGE UP
SPECIMEN SOURCE: SIGNIFICANT CHANGE UP
WBC # BLD: 5.91 K/UL — SIGNIFICANT CHANGE UP (ref 4.8–10.8)
WBC # BLD: 6.91 K/UL — SIGNIFICANT CHANGE UP (ref 4.8–10.8)
WBC # FLD AUTO: 5.91 K/UL — SIGNIFICANT CHANGE UP (ref 4.8–10.8)
WBC # FLD AUTO: 6.91 K/UL — SIGNIFICANT CHANGE UP (ref 4.8–10.8)

## 2021-04-07 PROCEDURE — 99232 SBSQ HOSP IP/OBS MODERATE 35: CPT

## 2021-04-07 PROCEDURE — 71045 X-RAY EXAM CHEST 1 VIEW: CPT | Mod: 26

## 2021-04-07 RX ORDER — POTASSIUM CHLORIDE 20 MEQ
40 PACKET (EA) ORAL EVERY 4 HOURS
Refills: 0 | Status: COMPLETED | OUTPATIENT
Start: 2021-04-07 | End: 2021-04-07

## 2021-04-07 RX ORDER — ALPRAZOLAM 0.25 MG
0.25 TABLET ORAL ONCE
Refills: 0 | Status: DISCONTINUED | OUTPATIENT
Start: 2021-04-07 | End: 2021-04-07

## 2021-04-07 RX ORDER — ENOXAPARIN SODIUM 100 MG/ML
40 INJECTION SUBCUTANEOUS DAILY
Refills: 0 | Status: DISCONTINUED | OUTPATIENT
Start: 2021-04-07 | End: 2021-04-09

## 2021-04-07 RX ORDER — ASPIRIN/CALCIUM CARB/MAGNESIUM 324 MG
81 TABLET ORAL DAILY
Refills: 0 | Status: DISCONTINUED | OUTPATIENT
Start: 2021-04-07 | End: 2021-04-09

## 2021-04-07 RX ADMIN — Medication 40 MILLIEQUIVALENT(S): at 14:54

## 2021-04-07 RX ADMIN — Medication 81 MILLIGRAM(S): at 13:14

## 2021-04-07 RX ADMIN — Medication 10 UNIT(S): at 17:09

## 2021-04-07 RX ADMIN — PANTOPRAZOLE SODIUM 40 MILLIGRAM(S): 20 TABLET, DELAYED RELEASE ORAL at 06:12

## 2021-04-07 RX ADMIN — INSULIN GLARGINE 30 UNIT(S): 100 INJECTION, SOLUTION SUBCUTANEOUS at 21:22

## 2021-04-07 RX ADMIN — Medication 2: at 17:09

## 2021-04-07 RX ADMIN — Medication 40 MILLIEQUIVALENT(S): at 13:13

## 2021-04-07 RX ADMIN — Medication 10 UNIT(S): at 11:56

## 2021-04-07 RX ADMIN — ENOXAPARIN SODIUM 40 MILLIGRAM(S): 100 INJECTION SUBCUTANEOUS at 13:14

## 2021-04-07 RX ADMIN — Medication 6 MILLIGRAM(S): at 06:11

## 2021-04-07 NOTE — CHART NOTE - NSCHARTNOTEFT_GEN_A_CORE
Transfer Note    Transfer from: Vent unit  Transfer to: general medical floors  Accepting physician:      Vent unit COURSE:    34 y/o F with no PMH now presenting to ED with 4 day h/o SOB and cough. Pt went to urgent care to get rapid swab for COVID which came back negative. Now presented to ED with worsening symptoms. In the ED pt was found to have DKA along with PNA on Xray. Pt was given IV insulin along with antibiotics. Admitted to MICU with acute hypoxemic respiratory failure, possible severe COVID PNA, and DKA.    ASSESSMENT & PLAN:     #Acute Hypoxemic Respiratory Failure, improved  #Possible severe COVID PNA  - Now satting 91-96% on 4L N/C  - Target Spo2 92-96%  - Supplemental O2 as needed  - Suspicion for COVID PNA (negative PCR x3, positive Ab)  - Less likely bacterial superinfection  - S/P Toci 4/4  - As per ID, can stop antibiotics as procalcitonin remains negative  - C/w dexamethasone 6mg daily (day #5), as per ID  - Trend inflammatory markers  - Encourage incentive spirometry  - Aspiration precautions  - F/u cultures until final  - C/w Lovenox daily    #DKA, resolved  - Monitor FS  - C/w basal/bolus insulin regimen + sliding scale    #RBBB  - Cardio f/u    #Misc  Diet: consistent carbs  GI PPx: Protonix  DVT PPx: Lovenox  Dispo: downgrade to general medical floors      Downgrade to [facility]      For Follow-Up:          Vital Signs Last 24 Hrs  T(C): 36.6 (07 Apr 2021 04:00), Max: 36.6 (07 Apr 2021 04:00)  T(F): 97.8 (07 Apr 2021 04:00), Max: 97.8 (07 Apr 2021 04:00)  HR: 93 (07 Apr 2021 12:00) (51 - 93)  BP: 99/80 (07 Apr 2021 12:00) (96/62 - 138/73)  BP(mean): 74 (07 Apr 2021 12:00) (73 - 97)  RR: 20 (07 Apr 2021 12:00) (20 - 20)  SpO2: 93% (07 Apr 2021 12:00) (91% - 97%)  I&O's Summary    06 Apr 2021 07:01  -  07 Apr 2021 07:00  --------------------------------------------------------  IN: 1625 mL / OUT: 2400 mL / NET: -775 mL    07 Apr 2021 07:01  -  07 Apr 2021 14:22  --------------------------------------------------------  IN: 225 mL / OUT: 0 mL / NET: 225 mL          MEDICATIONS  (STANDING):  aspirin  chewable 81 milliGRAM(s) Oral daily  dexAMETHasone  Injectable 6 milliGRAM(s) IV Push every 24 hours  dextrose 40% Gel 15 Gram(s) Oral once  dextrose 5%. 1000 milliLiter(s) (50 mL/Hr) IV Continuous <Continuous>  dextrose 5%. 1000 milliLiter(s) (100 mL/Hr) IV Continuous <Continuous>  dextrose 50% Injectable 25 Gram(s) IV Push once  dextrose 50% Injectable 25 Gram(s) IV Push once  dextrose 50% Injectable 12.5 Gram(s) IV Push once  enoxaparin Injectable 40 milliGRAM(s) SubCutaneous daily  glucagon  Injectable 1 milliGRAM(s) IntraMuscular once  insulin glargine Injectable (LANTUS) 30 Unit(s) SubCutaneous at bedtime  insulin lispro (ADMELOG) corrective regimen sliding scale   SubCutaneous three times a day before meals  insulin lispro Injectable (ADMELOG) 10 Unit(s) SubCutaneous before breakfast  insulin lispro Injectable (ADMELOG) 10 Unit(s) SubCutaneous before lunch  insulin lispro Injectable (ADMELOG) 10 Unit(s) SubCutaneous before dinner  pantoprazole    Tablet 40 milliGRAM(s) Oral before breakfast  potassium chloride   Powder 40 milliEquivalent(s) Oral every 4 hours    MEDICATIONS  (PRN):  ondansetron Injectable 4 milliGRAM(s) IV Push every 8 hours PRN Nausea and/or Vomiting        LABS                                            12.2                  Neurophils% (auto):   59.2   (04-07 @ 07:02):    5.91 )-----------(320          Lymphocytes% (auto):  27.9                                          37.7                   Eosinphils% (auto):   0.3      Manual%: Neutrophils x    ; Lymphocytes x    ; Eosinophils x    ; Bands%: x    ; Blasts x                                    138    |  102    |  14                  Calcium: 7.9   / iCa: x      (04-07 @ 07:02)    ----------------------------<  153       Magnesium: x                                3.2     |  28     |  <0.5             Phosphorous: x        TPro  5.4    /  Alb  2.8    /  TBili  0.3    /  DBili  x      /  AST  27     /  ALT  19     /  AlkPhos  89     07 Apr 2021 07:02

## 2021-04-07 NOTE — PROGRESS NOTE ADULT - ASSESSMENT
IMPRESSION:   Acute Hypoxemic Respiratory Failure on 4L NC  Possible severe COVID PNA, negative PCR x 3, +ve Ab  SP Toci 4/4  Less likely Bacterial superinfection  DKA, resolved  RBBB      SUGGEST:    CNS: Avoid CNS depressant    HEENT: Oral Care    PULMONARY: HOB at 45 degrees.  Aspiration precautions.  Supplemental O2 by NC.  Target POx 92 - 96%.   Encourage Incentive spirometry.  c/w Dexamethasone 6mg qd D#5    CARDIOVASCULAR:  Keep I = O.       GI: GI ppx.  Feeding as tolerated.  Bowel regimen.    RENAL:  FU CMP.  FU lytes.  Correct as needed.  Monitor UO.    INFECTIOUS:  Monitor off ABX.  FU Procalcitonin.  FU cultures.  ID FU.  Trend inflammatory markers    HEMATOLOGY:  DVT prophylaxis.      ENDOCRINOLOGY:  FU FS.  Insulin protocol    MUSCULOSKELETAL: OOB to chair, PT/OT    Downgrade to [facility]

## 2021-04-07 NOTE — PROGRESS NOTE ADULT - ASSESSMENT
ASSESSMENT  36 y/o F with no PMH now presenting to ED with 4 day h/o SOB and cough.    IMPRESSION  #DKA  #Pneumonia, community acqured vs COVID pneumonia  - CT Angio Chest PE Protocol w/ IV Cont (04.02.21 @ 11:46): No central pulmonary embolism. Patchy bilateral consolidative opacities, likely due to multifocal pneumonia. Follow-up to resolution is recommended. Mild mediastinal lymphadenopathy. nd of copied text >  - COVID negative PCR x 2  - COVID ab positive  - D-Dimer Assay, Quantitative: 213: Manufacturers recommended Cut off for VTE is 230 ng/ml D-DU ng/mL DDU (04.03.21 @ 11:30)  - C-Reactive Protein, Serum: 182: Please note: Reference range has changed due to units of measure change. mg/L (04.03.21 @ 04:30)  - Procalcitonin, Serum: 0.09 (04.02.21 @ 00:09)  - s/p Toci 4/4    #Abx allergy: NKDA    RECOMMENDATIONS  - s/p tocilizumab 4/4  - can stop antibiotics as procalcitonin remains negative  - continue dex 6 mg daily until discharge  - wean O2 as tolerated     Please call or message on [employer] Teams if with any questions.  Spectra [phone]

## 2021-04-08 LAB
ALBUMIN SERPL ELPH-MCNC: 3.3 G/DL — LOW (ref 3.5–5.2)
ALP SERPL-CCNC: 105 U/L — SIGNIFICANT CHANGE UP (ref 30–115)
ALT FLD-CCNC: 58 U/L — HIGH (ref 0–41)
ANION GAP SERPL CALC-SCNC: 10 MMOL/L — SIGNIFICANT CHANGE UP (ref 7–14)
AST SERPL-CCNC: 116 U/L — HIGH (ref 0–41)
BASOPHILS # BLD AUTO: 0 K/UL — SIGNIFICANT CHANGE UP (ref 0–0.2)
BASOPHILS NFR BLD AUTO: 0 % — SIGNIFICANT CHANGE UP (ref 0–1)
BILIRUB SERPL-MCNC: 0.5 MG/DL — SIGNIFICANT CHANGE UP (ref 0.2–1.2)
BUN SERPL-MCNC: 14 MG/DL — SIGNIFICANT CHANGE UP (ref 10–20)
CALCIUM SERPL-MCNC: 8.5 MG/DL — SIGNIFICANT CHANGE UP (ref 8.5–10.1)
CHLORIDE SERPL-SCNC: 101 MMOL/L — SIGNIFICANT CHANGE UP (ref 98–110)
CO2 SERPL-SCNC: 28 MMOL/L — SIGNIFICANT CHANGE UP (ref 17–32)
CREAT SERPL-MCNC: 0.5 MG/DL — LOW (ref 0.7–1.5)
EOSINOPHIL # BLD AUTO: 0.09 K/UL — SIGNIFICANT CHANGE UP (ref 0–0.7)
EOSINOPHIL NFR BLD AUTO: 1.7 % — SIGNIFICANT CHANGE UP (ref 0–8)
GAD65 AB SER-MCNC: 0.05 NMOL/L — HIGH
GIANT PLATELETS BLD QL SMEAR: PRESENT — SIGNIFICANT CHANGE UP
GLUCOSE BLDC GLUCOMTR-MCNC: 116 MG/DL — HIGH (ref 70–99)
GLUCOSE BLDC GLUCOMTR-MCNC: 207 MG/DL — HIGH (ref 70–99)
GLUCOSE BLDC GLUCOMTR-MCNC: 211 MG/DL — HIGH (ref 70–99)
GLUCOSE BLDC GLUCOMTR-MCNC: 215 MG/DL — HIGH (ref 70–99)
GLUCOSE SERPL-MCNC: 163 MG/DL — HIGH (ref 70–99)
HCT VFR BLD CALC: 40.6 % — SIGNIFICANT CHANGE UP (ref 37–47)
HGB BLD-MCNC: 13 G/DL — SIGNIFICANT CHANGE UP (ref 12–16)
LYMPHOCYTES # BLD AUTO: 0.96 K/UL — LOW (ref 1.2–3.4)
LYMPHOCYTES # BLD AUTO: 18.3 % — LOW (ref 20.5–51.1)
MANUAL SMEAR VERIFICATION: SIGNIFICANT CHANGE UP
MCHC RBC-ENTMCNC: 24.1 PG — LOW (ref 27–31)
MCHC RBC-ENTMCNC: 32 G/DL — SIGNIFICANT CHANGE UP (ref 32–37)
MCV RBC AUTO: 75.3 FL — LOW (ref 81–99)
MONOCYTES # BLD AUTO: 0.37 K/UL — SIGNIFICANT CHANGE UP (ref 0.1–0.6)
MONOCYTES NFR BLD AUTO: 7 % — SIGNIFICANT CHANGE UP (ref 1.7–9.3)
MYELOCYTES NFR BLD: 2.6 % — HIGH (ref 0–0)
NEUTROPHILS # BLD AUTO: 3.71 K/UL — SIGNIFICANT CHANGE UP (ref 1.4–6.5)
NEUTROPHILS NFR BLD AUTO: 68.7 % — SIGNIFICANT CHANGE UP (ref 42.2–75.2)
NEUTS BAND # BLD: 1.7 % — SIGNIFICANT CHANGE UP (ref 0–6)
PLAT MORPH BLD: NORMAL — SIGNIFICANT CHANGE UP
PLATELET # BLD AUTO: 357 K/UL — SIGNIFICANT CHANGE UP (ref 130–400)
POTASSIUM SERPL-MCNC: 3.9 MMOL/L — SIGNIFICANT CHANGE UP (ref 3.5–5)
POTASSIUM SERPL-SCNC: 3.9 MMOL/L — SIGNIFICANT CHANGE UP (ref 3.5–5)
PROT SERPL-MCNC: 6.1 G/DL — SIGNIFICANT CHANGE UP (ref 6–8)
RBC # BLD: 5.39 M/UL — SIGNIFICANT CHANGE UP (ref 4.2–5.4)
RBC # FLD: 14 % — SIGNIFICANT CHANGE UP (ref 11.5–14.5)
RBC BLD AUTO: NORMAL — SIGNIFICANT CHANGE UP
S PNEUM AG UR QL: NEGATIVE — SIGNIFICANT CHANGE UP
SMUDGE CELLS # BLD: PRESENT — SIGNIFICANT CHANGE UP
SODIUM SERPL-SCNC: 139 MMOL/L — SIGNIFICANT CHANGE UP (ref 135–146)
WBC # BLD: 5.27 K/UL — SIGNIFICANT CHANGE UP (ref 4.8–10.8)
WBC # FLD AUTO: 5.27 K/UL — SIGNIFICANT CHANGE UP (ref 4.8–10.8)

## 2021-04-08 PROCEDURE — 71045 X-RAY EXAM CHEST 1 VIEW: CPT | Mod: 26

## 2021-04-08 PROCEDURE — 99233 SBSQ HOSP IP/OBS HIGH 50: CPT

## 2021-04-08 RX ADMIN — Medication 10 UNIT(S): at 11:41

## 2021-04-08 RX ADMIN — INSULIN GLARGINE 30 UNIT(S): 100 INJECTION, SOLUTION SUBCUTANEOUS at 21:58

## 2021-04-08 RX ADMIN — ENOXAPARIN SODIUM 40 MILLIGRAM(S): 100 INJECTION SUBCUTANEOUS at 11:42

## 2021-04-08 RX ADMIN — Medication 6 MILLIGRAM(S): at 06:06

## 2021-04-08 RX ADMIN — Medication 81 MILLIGRAM(S): at 11:42

## 2021-04-08 RX ADMIN — Medication 2: at 11:41

## 2021-04-08 RX ADMIN — Medication 10 UNIT(S): at 17:24

## 2021-04-08 RX ADMIN — Medication 10 UNIT(S): at 08:20

## 2021-04-08 RX ADMIN — Medication 2: at 17:25

## 2021-04-08 RX ADMIN — PANTOPRAZOLE SODIUM 40 MILLIGRAM(S): 20 TABLET, DELAYED RELEASE ORAL at 06:05

## 2021-04-08 NOTE — PROGRESS NOTE ADULT - SUBJECTIVE AND OBJECTIVE BOX
Patient is a 35y old  Female who presents with a chief complaint of DKA (03 Apr 2021 14:38)        HPI:  36 y/o F with no PMH now presenting to ED with 4 day h/o SOB and cough. Pt went to urgent care to get rapid swab for COVID which came back negative. Now presented to ED with worsening symptoms. In the ED pt was found to have DKA along with PNA on Xray. Pt was given IV insulin along with antibiotics and called to MICU (01 Apr 2021 21:52)      Pt evaluated on AM rounds.  I reviewed the radiology tests and hospital record prior to visiting the patient.    Interval Events: No overnight events.    REVIEW OF SYSTEMS:   see HPI      OBJECTIVE:  ICU Vital Signs Last 24 Hrs  T(C): 36.7 (03 Apr 2021 18:41), Max: 37.2 (03 Apr 2021 15:22)  T(F): 98 (03 Apr 2021 18:41), Max: 98.9 (03 Apr 2021 15:22)  HR: 106 (04 Apr 2021 06:00) (100 - 124)  BP: 96/56 (04 Apr 2021 07:00) (96/56 - 112/64)  BP(mean): 69 (04 Apr 2021 07:00) (69 - 106)  ABP: --  ABP(mean): --  RR: 16 (04 Apr 2021 07:00) (16 - 18)  SpO2: 97% (04 Apr 2021 07:00) (95% - 98%)        04-03 @ 07:01  -  04-04 @ 07:00  --------------------------------------------------------  IN: 113 mL / OUT: 0 mL / NET: 113 mL      CAPILLARY BLOOD GLUCOSE      POCT Blood Glucose.: 130 mg/dL (04 Apr 2021 07:36)        PHYSICAL EXAM:     · CONSTITUTIONAL:   septic appearing,   well nourished,   NAD    · ENMT:   Airway patent,   Nasal mucosa clear.  Mouth with normal mucosa.   No thrush    · EYES:   Clear bilaterally,   pupils equal,   round and reactive to light.    · CARDIAC:   Normal rate,   regular rhythm.    Heart sounds S1, S2.   No murmurs, no rubs or gallops on auscultation  no edema        CAROTID:   normal systolic impulse  no bruits    · RESPIRATORY:   rales  normal chest expansion  no retractions or use of accessory muscles  palpation of chest is normal with no fremitus  percussion of chest demonstrates no hyperresonance or dullness    · GASTROINTESTINAL:  Abdomen soft,   non-tender,   + BS  liver/spleen not palpable    · MUSCULOSKELETAL:   no clubbing, cyanosis      · NEUROLOGICAL:   awake alert oriented  no obvious cranial nerve abnormalities      · SKIN:   Skin normal color for race,   warm, dry   No evidence of rash.        · HEME LYMPH:   no splenomegaly.  No cervical  lymphadenopathy.  no inguinal lymphadenopathy    HOSPITAL MEDICATIONS:  MEDICATIONS  (STANDING):  cefTRIAXone   IVPB      cefTRIAXone   IVPB 1000 milliGRAM(s) IV Intermittent every 24 hours  dexAMETHasone  Injectable 6 milliGRAM(s) IV Push every 24 hours  dextrose 40% Gel 15 Gram(s) Oral once  dextrose 5%. 1000 milliLiter(s) (50 mL/Hr) IV Continuous <Continuous>  dextrose 5%. 1000 milliLiter(s) (100 mL/Hr) IV Continuous <Continuous>  dextrose 50% Injectable 25 Gram(s) IV Push once  dextrose 50% Injectable 12.5 Gram(s) IV Push once  dextrose 50% Injectable 25 Gram(s) IV Push once  enoxaparin Injectable 40 milliGRAM(s) SubCutaneous two times a day  glucagon  Injectable 1 milliGRAM(s) IntraMuscular once  insulin regular Infusion 3 Unit(s)/Hr (3 mL/Hr) IV Continuous <Continuous>  lactated ringers. 1000 milliLiter(s) (75 mL/Hr) IV Continuous <Continuous>  levoFLOXacin IVPB      levoFLOXacin IVPB 750 milliGRAM(s) IV Intermittent every 24 hours    MEDICATIONS  (PRN):  ondansetron Injectable 4 milliGRAM(s) IV Push every 8 hours PRN Nausea and/or Vomiting    lactated ringers.: Solution, 1000 milliLiter(s) infuse at 75 mL/Hr  dextrose 5% + sodium chloride 0.45%.: Solution, 1000 milliLiter(s) infuse at 100 mL/Hr  dextrose 5% + sodium chloride 0.45%.: Solution, 1000 milliLiter(s) infuse at 100 mL/Hr  lactated ringers.: Solution, 1000 milliLiter(s) infuse at 100 mL/Hr  lactated ringers.: Solution, 1000 milliLiter(s) infuse at 150 mL/Hr  lactated ringers Bolus:   1000 milliLiter(s), IV Bolus, once, infuse over 60 Minute(s), Stop After 1 Doses  Provider's Contact #: [phone]  lactated ringers Bolus:   1000 milliLiter(s), IV Bolus, once, infuse over 60 Minute(s), Stop After 1 Doses  Provider's Contact #: [phone]      LABS:                        13.9   8.44  )-----------( 161      ( 03 Apr 2021 04:30 )             43.1     04-04    139  |  104  |  11  ----------------------------<  240<H>  3.4<L>   |  19  |  0.5<L>    Ca    8.4<L>      04 Apr 2021 00:29  Mg     1.8     04-03    TPro  6.7  /  Alb  3.3<L>  /  TBili  0.4  /  DBili  x   /  AST  38  /  ALT  15  /  AlkPhos  157<H>  04-03            CARDIAC MARKERS ( 03 Apr 2021 11:30 )  x     / <0.01 ng/mL / x     / x     / x      CARDIAC MARKERS ( 03 Apr 2021 04:30 )  x     / <0.01 ng/mL / x     / x     / x      CARDIAC MARKERS ( 02 Apr 2021 12:25 )  x     / <0.01 ng/mL / x     / x     / x                    RADIOLOGY: Today I personally reviewed latest CXR and other pertinent films.          
LENGTH OF HOSPITAL STAY: 7d      CHIEF COMPLAINT:   Patient is a 35y old  Female who presents with a chief complaint of DKA (07 Apr 2021 10:37)      OVER Past 24hrs:  The patient was seen and examined at bedside there were no overnight events.    HISTORY OF PRESENTING ILLNESS:    HPI:  34 y/o F with no PMH now presenting to ED with 4 day h/o SOB and cough. Pt went to urgent care to get rapid swab for COVID which came back negative. Now presented to ED with worsening symptoms. In the ED pt was found to have DKA along with PNA on Xray. Pt was given IV insulin along with antibiotics and called to MICU (01 Apr 2021 21:52)    PAST MEDICAL & SURGICAL HISTORY  PAST MEDICAL & SURGICAL HISTORY:  Migraines    No significant past surgical history          REVIEW OF SYSTEMS  CONSTITUTIONAL: No weakness, fevers or chills, no weight loss   EYES/ENT: No visual changes;  No vertigo or throat pain   NECK: No pain or stiffness  RESPIRATORY: No cough, wheezing, hemoptysis; No shortness of breath  CARDIOVASCULAR: No chest pain or palpitations  GASTROINTESTINAL: No abdominal or epigastric pain. No nausea, vomiting, or hematemesis; No diarrhea or constipation. No melena or hematochezia.  GENITOURINARY: No dysuria, frequency or hematuria  NEUROLOGICAL: No numbness or weakness  All other review of systems is negative unless indicated above.    ALLERGIES:  No Known Allergies    MEDICATIONS:  STANDING MEDICATIONS  aspirin  chewable 81 milliGRAM(s) Oral daily  dexAMETHasone  Injectable 6 milliGRAM(s) IV Push every 24 hours  dextrose 40% Gel 15 Gram(s) Oral once  dextrose 5%. 1000 milliLiter(s) IV Continuous <Continuous>  dextrose 5%. 1000 milliLiter(s) IV Continuous <Continuous>  dextrose 50% Injectable 25 Gram(s) IV Push once  dextrose 50% Injectable 25 Gram(s) IV Push once  dextrose 50% Injectable 12.5 Gram(s) IV Push once  enoxaparin Injectable 40 milliGRAM(s) SubCutaneous daily  glucagon  Injectable 1 milliGRAM(s) IntraMuscular once  insulin glargine Injectable (LANTUS) 30 Unit(s) SubCutaneous at bedtime  insulin lispro (ADMELOG) corrective regimen sliding scale   SubCutaneous three times a day before meals  insulin lispro Injectable (ADMELOG) 10 Unit(s) SubCutaneous before breakfast  insulin lispro Injectable (ADMELOG) 10 Unit(s) SubCutaneous before lunch  insulin lispro Injectable (ADMELOG) 10 Unit(s) SubCutaneous before dinner  pantoprazole    Tablet 40 milliGRAM(s) Oral before breakfast    PRN MEDICATIONS  ondansetron Injectable 4 milliGRAM(s) IV Push every 8 hours PRN    VITALS:   T(F): 97.7  HR: 79  BP: 101/65  RR: 18  SpO2: 96%    PHYSICAL EXAM:  General: No acute distress.      HEENT: Pupils equal, reactive to light symmetrically.    PULM: Clear to auscultation bilaterally, no significant sputum production.    CVS: Regular rate and rhythm, no murmurs, rubs, or gallops.    GI: Soft, nondistended, nontender, no masses.    MSK: No edema, nontender.    SKIN: Warm and well perfused, no rashes noted.    PSYCH: Alert, oriented, interactive, nonfocal.        LABS:                        13.0   5.27  )-----------( 357      ( 08 Apr 2021 08:46 )             40.6     04-08    139  |  101  |  14  ----------------------------<  163<H>  3.9   |  28  |  0.5<L>    Ca    8.5      08 Apr 2021 08:46    TPro  6.1  /  Alb  3.3<L>  /  TBili  0.5  /  DBili  x   /  AST  116<H>  /  ALT  58<H>  /  AlkPhos  105  04-08              Culture - Blood (collected 06 Apr 2021 06:49)  Source: .Blood Blood  Preliminary Report (07 Apr 2021 14:02):    No growth to date.          RADIOLOGY:                      
CHERYISA  35y, Female  Allergy: No Known Allergies      LOS  4d    CHIEF COMPLAINT: DKA (05 Apr 2021 11:08)      INTERVAL EVENTS/HPI  - No acute events overnight  - T(F): , Max: 98.5 (04-04-21 @ 20:00)  - remains on HFNC  - WBC Count: 8.58 (04-04-21 @ 04:30)  WBC Count: 8.44 (04-03-21 @ 04:30)     - Creatinine, Serum: 0.5 (04-04-21 @ 04:30)  Creatinine, Serum: 0.5 (04-04-21 @ 00:29)       ROS  General: Denies rigors, nightsweats  HEENT: Denies headache, rhinorrhea, sore throat, eye pain  CV: Denies CP, palpitations  PULM: Denies wheezing, hemoptysis  GI: Denies hematemesis, hematochezia, melena  : Denies discharge, hematuria  MSK: Denies arthralgias, myalgias  SKIN: Denies rash, lesions  NEURO: Denies paresthesias, weakness  PSYCH: Denies depression, anxiety    VITALS:  T(F): 96.7, Max: 98.5 (04-04-21 @ 20:00)  HR: 96  BP: 103/63  RR: 19Vital Signs Last 24 Hrs  T(C): 35.9 (05 Apr 2021 11:00), Max: 36.9 (04 Apr 2021 20:00)  T(F): 96.7 (05 Apr 2021 11:00), Max: 98.5 (04 Apr 2021 20:00)  HR: 96 (05 Apr 2021 12:00) (68 - 107)  BP: 103/63 (05 Apr 2021 11:00) (90/58 - 111/60)  BP(mean): 72 (05 Apr 2021 06:00) (68 - 80)  RR: 19 (05 Apr 2021 12:00) (18 - 22)  SpO2: 99% (05 Apr 2021 12:00) (93% - 99%)    PHYSICAL EXAM:  Gen: NAD, resting in bed  HEENT: Normocephalic, atraumatic  Neck: supple, no lymphadenopathy  CV: Regular rate & regular rhythm  Lungs: decreased BS at bases, no fremitus  Abdomen: Soft, BS present  Ext: Warm, well perfused  Neuro: non focal, awake  Skin: no rash, no erythema  Lines: no phlebitis    FH: Non-contributory  Social Hx: Non-contributory    TESTS & MEASUREMENTS:                        12.7   8.58  )-----------( 293      ( 04 Apr 2021 04:30 )             37.9     04-04    141  |  107  |  12  ----------------------------<  152<H>  3.7   |  22  |  0.5<L>    Ca    8.4<L>      04 Apr 2021 04:30  Mg     1.9     04-04    TPro  6.2  /  Alb  3.1<L>  /  TBili  0.3  /  DBili  x   /  AST  30  /  ALT  15  /  AlkPhos  127<H>  04-04      LIVER FUNCTIONS - ( 04 Apr 2021 04:30 )  Alb: 3.1 g/dL / Pro: 6.2 g/dL / ALK PHOS: 127 U/L / ALT: 15 U/L / AST: 30 U/L / GGT: x               Culture - Blood (collected 04-01-21 @ 20:50)  Source: .Blood Blood-Peripheral  Preliminary Report (04-03-21 @ 04:01):    No growth to date.    Culture - Blood (collected 04-01-21 @ 20:44)  Source: .Blood Blood-Peripheral  Preliminary Report (04-03-21 @ 04:01):    No growth to date.        Lactate, Blood: 1.1 mmol/L (04-02-21 @ 23:30)  Blood Gas Venous - Lactate: ?1.8 mmoL/L (04-01-21 @ 19:58)  Lactate, Blood: 2.2 mmol/L (04-01-21 @ 18:39)      INFECTIOUS DISEASES TESTING  COVID-19 PCR: NotDetec (04-03-21 @ 06:30)  Procalcitonin, Serum: 0.09 (04-02-21 @ 00:09)  COVID-19 PCR: NotDetec (04-01-21 @ 19:19)      INFLAMMATORY MARKERS  C-Reactive Protein, Serum: 182 mg/L (04-03-21 @ 04:30)  Sedimentation Rate, Erythrocyte: 58 mm/Hr (04-03-21 @ 04:30)      RADIOLOGY & ADDITIONAL TESTS:  I have personally reviewed the last available Chest xray  CXR  Xray Chest 1 View AP/PA:   EXAM:  XR CHEST 1 VIEW            PROCEDURE DATE:  04/05/2021            INTERPRETATION:  INDICATION: Pneumonia.    TECHNIQUE/POSITIONING: Single frontal view the chest was obtained.    COMPARISON: Chest radiograph 4/4/2021.    FINDINGS:    CARDIAC/MEDIASTINUM/HILUM: Obscured.    LUNG PARENCHYMA/PLEURA: Low lung volumes. Stable bilateral opacities. No evidence of pneumothorax.    SKELETON/SOFT TISSUES: Stable.      IMPRESSION:    Low lung volumes. Stable bilateral opacities.            VIOLET CARNEY M.D., RESIDENT RADIOLOGIST  This document has been electronically signed.  MARA VILLANUEVA MD; Attending Radiologist  This document has been electronically signed. Apr 5 2021 10:09AM (04-05-21 @ 08:13)      CT      CARDIOLOGY TESTING  12 Lead ECG:   Ventricular Rate 113 BPM    Atrial Rate 113 BPM    P-R Interval 144 ms    QRS Duration 128 ms    Q-T Interval 362 ms    QTC Calculation(Bazett) 496 ms    P Axis 31 degrees    R Axis -73 degrees    T Axis 53 degrees    Diagnosis Line Sinus tachycardia  Left axis deviation  Right bundle branch block  Possible Lateral infarct , age undetermined  Abnormal ECG    Confirmed by Tenzin Arguello ([id number]) on 4/1/2021 10:13:41 PM (04-01-21 @ 19:57)      MEDICATIONS  cefTRIAXone   IVPB     cefTRIAXone   IVPB 1000 IV Intermittent every 24 hours  dexAMETHasone  Injectable 6 IV Push every 24 hours  dextrose 40% Gel 15 Oral once  dextrose 5%. 1000 IV Continuous <Continuous>  dextrose 5%. 1000 IV Continuous <Continuous>  dextrose 50% Injectable 25 IV Push once  dextrose 50% Injectable 25 IV Push once  dextrose 50% Injectable 12.5 IV Push once  enoxaparin Injectable 40 SubCutaneous two times a day  glucagon  Injectable 1 IntraMuscular once  insulin glargine Injectable (LANTUS) 30 SubCutaneous at bedtime  insulin lispro (ADMELOG) corrective regimen sliding scale  SubCutaneous three times a day before meals  insulin lispro Injectable (ADMELOG) 10 SubCutaneous before breakfast  insulin lispro Injectable (ADMELOG) 10 SubCutaneous before lunch  insulin lispro Injectable (ADMELOG) 10 SubCutaneous before dinner  insulin regular Infusion 3 IV Continuous <Continuous>  lactated ringers. 1000 IV Continuous <Continuous>  levoFLOXacin IVPB     levoFLOXacin IVPB 750 IV Intermittent every 24 hours  pantoprazole    Tablet 40 Oral before breakfast      WEIGHT  Weight (kg): 85 (04-02-21 @ 23:30)      ANTIBIOTICS:  cefTRIAXone   IVPB      cefTRIAXone   IVPB 1000 milliGRAM(s) IV Intermittent every 24 hours  levoFLOXacin IVPB      levoFLOXacin IVPB 750 milliGRAM(s) IV Intermittent every 24 hours      All available historical records have been reviewed      
    OVERNIGHT EVENTS: events noted, worsening status/ CXR on 100% NRM, covid abx + ( didnt receive vaccine)    Vital Signs Last 24 Hrs  T(C): 36.9 (02 Apr 2021 20:30), Max: 37.4 (02 Apr 2021 07:42)  T(F): 98.4 (02 Apr 2021 20:30), Max: 99.4 (02 Apr 2021 07:42)  HR: 111 (03 Apr 2021 03:30) (100 - 122)  BP: 108/61 (03 Apr 2021 03:30) (104/61 - 120/70  RR: 19 (03 Apr 2021 03:30) (16 - 19)  SpO2: 94% (03 Apr 2021 03:30) (93% - 96%)    PHYSICAL EXAMINATION:    GENERAL: Ill looking    HEENT: Head is normocephalic and atraumatic.     NECK: Supple.    LUNGS: bl crackles    HEART: Regular rate and rhythm without murmur.    ABDOMEN: Soft, nontender, and nondistended.      EXTREMITIES: Without any cyanosis, clubbing, rash, lesions or edema.    NEUROLOGIC: Grossly intact.    SKIN: No ulceration or induration present.      LABS:                        14.0   6.81  )-----------( 191      ( 02 Apr 2021 06:26 )             43.8     04-02    140  |  106  |  7<L>  ----------------------------<  220<H>  3.6   |  18  |  0.5<L>    Ca    8.0<L>      02 Apr 2021 23:30    TPro  7.0  /  Alb  3.2<L>  /  TBili  0.3  /  DBili  x   /  AST  55<H>  /  ALT  15  /  AlkPhos  159<H>  04-02          CARDIAC MARKERS ( 02 Apr 2021 12:25 )  x     / <0.01 ng/mL / x     / x     / x              Lactate, Blood: 1.1 mmol/L (04-02-21 @ 23:30)    Procalcitonin, Serum: 0.09 ng/mL (04-02-21 @ 00:09)          MICROBIOLOGY:  Culture Results:   No growth to date. (04-01 @ 20:50)  Culture Results:   No growth to date. (04-01 @ 20:44)      MEDICATIONS  (STANDING):  cefTRIAXone   IVPB      cefTRIAXone   IVPB 1000 milliGRAM(s) IV Intermittent once  dexAMETHasone  Injectable 6 milliGRAM(s) IV Push every 24 hours  dextrose 40% Gel 15 Gram(s) Oral once  dextrose 5% + sodium chloride 0.45%. 1000 milliLiter(s) (100 mL/Hr) IV Continuous <Continuous>  dextrose 5%. 1000 milliLiter(s) (50 mL/Hr) IV Continuous <Continuous>  dextrose 5%. 1000 milliLiter(s) (100 mL/Hr) IV Continuous <Continuous>  dextrose 50% Injectable 25 Gram(s) IV Push once  dextrose 50% Injectable 12.5 Gram(s) IV Push once  dextrose 50% Injectable 25 Gram(s) IV Push once  enoxaparin Injectable 40 milliGRAM(s) SubCutaneous daily  glucagon  Injectable 1 milliGRAM(s) IntraMuscular once  insulin glargine Injectable (LANTUS) 15 Unit(s) SubCutaneous at bedtime  insulin lispro (ADMELOG) corrective regimen sliding scale   SubCutaneous three times a day before meals  insulin lispro Injectable (ADMELOG) 5 Unit(s) SubCutaneous three times a day before meals  insulin regular Infusion 1 Unit(s)/Hr (1 mL/Hr) IV Continuous <Continuous>  levoFLOXacin IVPB      levoFLOXacin IVPB 750 milliGRAM(s) IV Intermittent once  metoclopramide Injectable 10 milliGRAM(s) IV Push once    MEDICATIONS  (PRN):  ondansetron Injectable 4 milliGRAM(s) IV Push every 8 hours PRN Nausea and/or Vomiting      RADIOLOGY & ADDITIONAL STUDIES:    
INTERIM: Seen by the bedside. AAOx 3, pt is feeling hungry. Currently on insulin drip at 2units/hr. Also on rx with decadron and abx for suspected covid pneumonia. off note pt states she used to weigh over 500 lbs and now is ~210 lbs, Wt loss secondary to diet and LSC. Denies prior wt loss sx.       Current Meds:  cefTRIAXone   IVPB      cefTRIAXone   IVPB 1000 milliGRAM(s) IV Intermittent every 24 hours  dexAMETHasone  Injectable 6 milliGRAM(s) IV Push every 24 hours  dextrose 40% Gel 15 Gram(s) Oral once  dextrose 5%. 1000 milliLiter(s) IV Continuous <Continuous>  dextrose 5%. 1000 milliLiter(s) IV Continuous <Continuous>  dextrose 50% Injectable 25 Gram(s) IV Push once  dextrose 50% Injectable 12.5 Gram(s) IV Push once  dextrose 50% Injectable 25 Gram(s) IV Push once  enoxaparin Injectable 40 milliGRAM(s) SubCutaneous two times a day  glucagon  Injectable 1 milliGRAM(s) IntraMuscular once  insulin regular Infusion 3 Unit(s)/Hr IV Continuous <Continuous>  lactated ringers. 1000 milliLiter(s) IV Continuous <Continuous>  levoFLOXacin IVPB 750 milliGRAM(s) IV Intermittent every 24 hours  levoFLOXacin IVPB      ondansetron Injectable 4 milliGRAM(s) IV Push every 8 hours PRN  tocilizumab IVPB 600 milliGRAM(s) IV Intermittent once      Allergies:  No Known Allergies        Weight (kg): 85 (04-02 @ 23:30)    Vital Signs Last 24 Hrs  T(C): 36.7 (04 Apr 2021 09:00), Max: 37.2 (03 Apr 2021 15:22)  T(F): 98.1 (04 Apr 2021 09:00), Max: 98.9 (03 Apr 2021 15:22)  HR: 106 (04 Apr 2021 10:00) (95 - 109)  BP: 94/61 (04 Apr 2021 10:00) (94/61 - 112/64)  BP(mean): 72 (04 Apr 2021 10:00) (69 - 106)  RR: 18 (04 Apr 2021 09:00) (16 - 18)  SpO2: 99% (04 Apr 2021 10:00) (95% - 99%)    LABS:                        12.7   8.58  )-----------( 293      ( 04 Apr 2021 04:30 )             37.9     04-04    141  |  107  |  12  ----------------------------<  152<H>  3.7   |  22  |  0.5<L>    Ca    8.4<L>      04 Apr 2021 04:30  Mg     1.9     04-04    TPro  6.2  /  Alb  3.1<L>  /  TBili  0.3  /  DBili  x   /  AST  30  /  ALT  15  /  AlkPhos  127<H>  04-04          Thyroid Stimulating Hormone, Serum: 0.66 (04-03 @ 04:30)        
Patient is a 35y old  Female who presents with a chief complaint of DKA (04 Apr 2021 12:24)    Over Night Events:  Remains on HHFNC at 50L/70%.  Off pressors.  Afebrile.    ROS:   All ROS are negative except HPI     PHYSICAL EXAM  ICU Vital Signs Last 24 Hrs  T(C): 35.9 (05 Apr 2021 08:00), Max: 36.9 (04 Apr 2021 20:00)  T(F): 96.6 (05 Apr 2021 08:00), Max: 98.5 (04 Apr 2021 20:00)  HR: 83 (05 Apr 2021 08:59) (68 - 107)  BP: 105/70 (05 Apr 2021 08:59) (90/58 - 111/60)  BP(mean): 72 (05 Apr 2021 06:00) (68 - 80)  RR: 20 (05 Apr 2021 08:59) (18 - 22)  SpO2: 98% (05 Apr 2021 08:59) (93% - 98%)    · CONSTITUTIONAL:   not septic appearing,   well nourished,   NAD    · ENMT:   Airway patent,   Nasal mucosa clear.  Mouth with normal mucosa.   No thrush    · EYES:   Clear bilaterally,   pupils equal,   round and reactive to light.    · CARDIAC:   Normal rate,   Regular rhythm.    Heart sounds S1, S2.   No murmurs, no rubs or gallops on auscultation  no edema    · RESPIRATORY:   bilateral crackles  normal chest expansion  no retractions or use of accessory muscles  palpation of chest is normal with no fremitus  percussion of chest demonstrates no hyperresonance or dullness    · GASTROINTESTINAL:  Abdomen soft,   non-tender,   + BS    · MUSCULOSKELETAL:   no clubbing, cyanosis    · NEUROLOGICAL:   awake alert oriented  no obvious cranial nerve abnormalities    · SKIN:   Skin normal color for race,   warm, dry   No evidence of rash    04-04-21 @ 07:01  -  04-05-21 @ 07:00  --------------------------------------------------------  IN:    Insulin: 28 mL    IV PiggyBack: 100 mL    Lactated Ringers: 1800 mL    Oral Fluid: 480 mL  Total IN: 2408 mL    OUT:    Voided (mL): 1100 mL  Total OUT: 1100 mL    Total NET: 1308 mL        LABS:                            12.7   8.58  )-----------( 293      ( 04 Apr 2021 04:30 )             37.9     141  |  107  |  12  ----------------------------<  152<H>  3.7   |  22  |  0.5<L>    Ca    8.4<L>      04 Apr 2021 04:30  Mg     1.9     04-04    TPro  6.2  /  Alb  3.1<L>  /  TBili  0.3  /  DBili  x   /  AST  30  /  ALT  15  /  AlkPhos  127<H>  04-04    CARDIAC MARKERS ( 03 Apr 2021 11:30 )  x     / <0.01 ng/mL / x     / x     / x        LIVER FUNCTIONS - ( 04 Apr 2021 04:30 )  Alb: 3.1 g/dL / Pro: 6.2 g/dL / ALK PHOS: 127 U/L / ALT: 15 U/L / AST: 30 U/L / GGT: x                                                MEDICATIONS  (STANDING):  cefTRIAXone   IVPB      cefTRIAXone   IVPB 1000 milliGRAM(s) IV Intermittent every 24 hours  dexAMETHasone  Injectable 6 milliGRAM(s) IV Push every 24 hours  dextrose 40% Gel 15 Gram(s) Oral once  dextrose 5%. 1000 milliLiter(s) (50 mL/Hr) IV Continuous <Continuous>  dextrose 5%. 1000 milliLiter(s) (100 mL/Hr) IV Continuous <Continuous>  dextrose 50% Injectable 25 Gram(s) IV Push once  dextrose 50% Injectable 12.5 Gram(s) IV Push once  dextrose 50% Injectable 25 Gram(s) IV Push once  enoxaparin Injectable 40 milliGRAM(s) SubCutaneous two times a day  glucagon  Injectable 1 milliGRAM(s) IntraMuscular once  insulin glargine Injectable (LANTUS) 30 Unit(s) SubCutaneous at bedtime  insulin lispro (ADMELOG) corrective regimen sliding scale   SubCutaneous three times a day before meals  insulin lispro Injectable (ADMELOG) 10 Unit(s) SubCutaneous before breakfast  insulin lispro Injectable (ADMELOG) 10 Unit(s) SubCutaneous before lunch  insulin lispro Injectable (ADMELOG) 10 Unit(s) SubCutaneous before dinner  insulin regular Infusion 3 Unit(s)/Hr (3 mL/Hr) IV Continuous <Continuous>  lactated ringers. 1000 milliLiter(s) (75 mL/Hr) IV Continuous <Continuous>  levoFLOXacin IVPB 750 milliGRAM(s) IV Intermittent every 24 hours  levoFLOXacin IVPB        MEDICATIONS  (PRN):  ondansetron Injectable 4 milliGRAM(s) IV Push every 8 hours PRN Nausea and/or Vomiting      New X-rays reviewed:                                                                                  ECHO    CXR interpreted by me:   bilateral opacities, low lung volumes    
Patient is a 35y old  Female who presents with a chief complaint of DKA (05 Apr 2021 12:09)    Over Night Events:  No overnight events.  Remains on HHFNC 50L/50%    ROS:   All ROS are negative except HPI     PHYSICAL EXAM  ICU Vital Signs Last 24 Hrs  T(C): 36.3 (06 Apr 2021 08:00), Max: 36.6 (05 Apr 2021 16:00)  T(F): 97.4 (06 Apr 2021 08:00), Max: 97.9 (05 Apr 2021 16:00)  HR: 77 (06 Apr 2021 08:00) (54 - 100)  BP: 108/73 (06 Apr 2021 08:00) (88/54 - 111/75)  BP(mean): 74 (06 Apr 2021 08:00) (66 - 80)  RR: 20 (06 Apr 2021 08:00) (16 - 22)  SpO2: 97% (06 Apr 2021 08:25) (96% - 100%)    · CONSTITUTIONAL:   not septic appearing,   well nourished,   NAD    · ENMT:   Airway patent,   Nasal mucosa clear.  Mouth with normal mucosa.   No thrush    · EYES:   Clear bilaterally,   pupils equal,   round and reactive to light.    · CARDIAC:   Normal rate,   Regular rhythm.    Heart sounds S1, S2.   No murmurs, no rubs or gallops on auscultation  no edema    · RESPIRATORY:   bilateral crackles  normal chest expansion  no retractions or use of accessory muscles  palpation of chest is normal with no fremitus  percussion of chest demonstrates no hyperresonance or dullness    · GASTROINTESTINAL:  Abdomen soft,   non-tender,   + BS    · MUSCULOSKELETAL:   no clubbing, cyanosis    · NEUROLOGICAL:   awake alert oriented  no obvious cranial nerve abnormalities    · SKIN:   Skin normal color for race,   warm, dry   No evidence of rash    04-05-21 @ 07:01  -  04-06-21 @ 07:00  --------------------------------------------------------  IN:    Lactated Ringers: 1275 mL    Oral Fluid: 500 mL  Total IN: 1775 mL    OUT:    Voided (mL): 900 mL  Total OUT: 900 mL    Total NET: 875 mL        LABS:                            11.9   6.26  )-----------( 330      ( 06 Apr 2021 06:49 )             37.8                                               04-06    142  |  108  |  15  ----------------------------<  74  3.2<L>   |  26  |  <0.5<L>    Ca    8.1<L>      06 Apr 2021 06:49  Phos  3.4     04-06  Mg     1.9     04-06    TPro  5.4<L>  /  Alb  2.9<L>  /  TBili  0.4  /  DBili  x   /  AST  19  /  ALT  14  /  AlkPhos  103  04-06    PT/INR - ( 06 Apr 2021 06:49 )   PT: 13.40 sec;   INR: 1.17 ratio       LIVER FUNCTIONS - ( 06 Apr 2021 06:49 )  Alb: 2.9 g/dL / Pro: 5.4 g/dL / ALK PHOS: 103 U/L / ALT: 14 U/L / AST: 19 U/L / GGT: x                                              MEDICATIONS  (STANDING):  ALPRAZolam 0.25 milliGRAM(s) Oral daily  dexAMETHasone  Injectable 6 milliGRAM(s) IV Push every 24 hours  dextrose 40% Gel 15 Gram(s) Oral once  dextrose 5%. 1000 milliLiter(s) (50 mL/Hr) IV Continuous <Continuous>  dextrose 5%. 1000 milliLiter(s) (100 mL/Hr) IV Continuous <Continuous>  dextrose 50% Injectable 25 Gram(s) IV Push once  dextrose 50% Injectable 12.5 Gram(s) IV Push once  dextrose 50% Injectable 25 Gram(s) IV Push once  enoxaparin Injectable 40 milliGRAM(s) SubCutaneous two times a day  glucagon  Injectable 1 milliGRAM(s) IntraMuscular once  insulin glargine Injectable (LANTUS) 30 Unit(s) SubCutaneous at bedtime  insulin lispro (ADMELOG) corrective regimen sliding scale   SubCutaneous three times a day before meals  insulin lispro Injectable (ADMELOG) 10 Unit(s) SubCutaneous before breakfast  insulin lispro Injectable (ADMELOG) 10 Unit(s) SubCutaneous before lunch  insulin lispro Injectable (ADMELOG) 10 Unit(s) SubCutaneous before dinner  insulin regular Infusion 3 Unit(s)/Hr (3 mL/Hr) IV Continuous <Continuous>  lactated ringers. 1000 milliLiter(s) (75 mL/Hr) IV Continuous <Continuous>  pantoprazole    Tablet 40 milliGRAM(s) Oral before breakfast    MEDICATIONS  (PRN):  ondansetron Injectable 4 milliGRAM(s) IV Push every 8 hours PRN Nausea and/or Vomiting      New X-rays reviewed:                                                                                  ECHO    CXR interpreted by me:  bilateral basilar opacifications improved from prior    
CHERY ISA  35y, Female  Allergy: No Known Allergies      LOS  6d    CHIEF COMPLAINT: DKA (06 Apr 2021 10:42)      INTERVAL EVENTS/HPI  - No acute events overnight  - T(F): , Max: 98.1 (04-06-21 @ 12:00)  - now on 4L NC  - WBC Count: 5.91 (04-07-21 @ 07:02)  WBC Count: 6.91 (04-07-21 @ 01:26)     - Creatinine, Serum: <0.5 (04-07-21 @ 07:02)  Creatinine, Serum: <0.5 (04-06-21 @ 06:49)       ROS  General: Denies rigors, nightsweats  HEENT: Denies headache, rhinorrhea, sore throat, eye pain  CV: Denies CP, palpitations  PULM: Denies wheezing, hemoptysis  GI: Denies hematemesis, hematochezia, melena  : Denies discharge, hematuria  MSK: Denies arthralgias, myalgias  SKIN: Denies rash, lesions  NEURO: Denies paresthesias, weakness  PSYCH: Denies depression, anxiety    VITALS:  T(F): 97.8, Max: 98.1 (04-06-21 @ 12:00)  HR: 85  BP: 138/73  RR: 20Vital Signs Last 24 Hrs  T(C): 36.6 (07 Apr 2021 04:00), Max: 36.7 (06 Apr 2021 12:00)  T(F): 97.8 (07 Apr 2021 04:00), Max: 98.1 (06 Apr 2021 12:00)  HR: 85 (07 Apr 2021 10:00) (51 - 92)  BP: 138/73 (07 Apr 2021 10:00) (96/62 - 138/73)  BP(mean): 97 (07 Apr 2021 10:00) (73 - 97)  RR: 20 (07 Apr 2021 10:00) (20 - 20)  SpO2: 95% (07 Apr 2021 10:00) (91% - 98%)    PHYSICAL EXAM:  Gen: NAD, resting in bed  HEENT: Normocephalic, atraumatic  Neck: supple, no lymphadenopathy  CV: Regular rate & regular rhythm  Lungs: decreased BS at bases, no fremitus  Abdomen: Soft, BS present  Ext: Warm, well perfused  Neuro: non focal, awake  Skin: no rash, no erythema  Lines: no phlebitis    FH: Non-contributory  Social Hx: Non-contributory    TESTS & MEASUREMENTS:                        12.2   5.91  )-----------( 320      ( 07 Apr 2021 07:02 )             37.7     04-07    138  |  102  |  14  ----------------------------<  153<H>  3.2<L>   |  28  |  <0.5<L>    Ca    7.9<L>      07 Apr 2021 07:02  Phos  3.4     04-06  Mg     1.9     04-06    TPro  5.4<L>  /  Alb  2.8<L>  /  TBili  0.3  /  DBili  x   /  AST  27  /  ALT  19  /  AlkPhos  89  04-07    eGFR if Non African American: 134 mL/min/1.73M2 (04-07-21 @ 07:02)  eGFR if African American: 155 mL/min/1.73M2 (04-07-21 @ 07:02)    LIVER FUNCTIONS - ( 07 Apr 2021 07:02 )  Alb: 2.8 g/dL / Pro: 5.4 g/dL / ALK PHOS: 89 U/L / ALT: 19 U/L / AST: 27 U/L / GGT: x               Culture - Blood (collected 04-01-21 @ 20:50)  Source: .Blood Blood-Peripheral  Final Report (04-07-21 @ 04:00):    No Growth Final    Culture - Blood (collected 04-01-21 @ 20:44)  Source: .Blood Blood-Peripheral  Final Report (04-07-21 @ 04:00):    No Growth Final        Lactate, Blood: 1.1 mmol/L (04-02-21 @ 23:30)      INFECTIOUS DISEASES TESTING  COVID-19 PCR: NotDetec (04-06-21 @ 15:10)  Procalcitonin, Serum: 0.05 (04-06-21 @ 04:30)  Procalcitonin, Serum: 0.10 (04-04-21 @ 04:30)  HIV-1/2 Combo Result: Nonreact (04-04-21 @ 04:30)  Streptococcus Pneumoniae Ag Urine: Negative (04-03-21 @ 19:10)  Legionella Antigen, Urine: Negative (04-03-21 @ 19:10)  COVID-19 PCR: NotDetec (04-03-21 @ 06:30)  Legionella Antigen, Urine: Negative (04-02-21 @ 20:05)  Procalcitonin, Serum: 0.09 (04-02-21 @ 00:09)  COVID-19 PCR: NotDetec (04-01-21 @ 19:19)      INFLAMMATORY MARKERS  C-Reactive Protein, Serum: 57 mg/L (04-05-21 @ 07:12)  C-Reactive Protein, Serum: 182 mg/L (04-03-21 @ 04:30)  Sedimentation Rate, Erythrocyte: 58 mm/Hr (04-03-21 @ 04:30)      RADIOLOGY & ADDITIONAL TESTS:  I have personally reviewed the last available Chest xray  CXR  Xray Chest 1 View AP/PA:   EXAM:  XR CHEST 1 VIEW            PROCEDURE DATE:  04/05/2021            INTERPRETATION:  INDICATION: Pneumonia.    TECHNIQUE/POSITIONING: Single frontal view the chest was obtained.    COMPARISON: Chest radiograph 4/4/2021.    FINDINGS:    CARDIAC/MEDIASTINUM/HILUM: Obscured.    LUNG PARENCHYMA/PLEURA: Low lung volumes. Stable bilateral opacities. No evidence of pneumothorax.    SKELETON/SOFT TISSUES: Stable.      IMPRESSION:    Low lung volumes. Stable bilateral opacities.            VIOLET CARNEY M.D., RESIDENT RADIOLOGIST  This document has been electronically signed.  MARA VILLANUEVA MD; Attending Radiologist  This document has been electronically signed. Apr 5 2021 10:09AM (04-05-21 @ 08:13)      CT      CARDIOLOGY TESTING  12 Lead ECG:   Ventricular Rate 80 BPM    Atrial Rate 80 BPM    P-R Interval 138 ms    QRS Duration 130 ms    Q-T Interval 408 ms    QTC Calculation(Bazett) 470 ms    P Axis 30 degrees    R Axis -11 degrees    T Axis -13 degrees    Diagnosis Line Normal sinus rhythm  Right bundle branch block  Abnormal ECG    Confirmed by JENELLE ABEL MD ([id number]) on 4/5/2021 12:59:04 PM (04-05-21 @ 11:43)  12 Lead ECG:   Ventricular Rate 113 BPM    Atrial Rate 113 BPM    P-R Interval 144 ms    QRS Duration 128 ms    Q-T Interval 362 ms    QTC Calculation(Bazett) 496 ms    P Axis 31 degrees    R Axis -73 degrees    T Axis 53 degrees    Diagnosis Line Sinus tachycardia  Left axis deviation  Right bundle branch block  Possible Lateral infarct , age undetermined  Abnormal ECG    Confirmed by Tenzin Arguello ([id number]) on 4/1/2021 10:13:41 PM (04-01-21 @ 19:57)      MEDICATIONS  ALPRAZolam 0.25 Oral daily  dexAMETHasone  Injectable 6 IV Push every 24 hours  dextrose 40% Gel 15 Oral once  dextrose 5%. 1000 IV Continuous <Continuous>  dextrose 5%. 1000 IV Continuous <Continuous>  dextrose 50% Injectable 25 IV Push once  dextrose 50% Injectable 25 IV Push once  dextrose 50% Injectable 12.5 IV Push once  glucagon  Injectable 1 IntraMuscular once  insulin glargine Injectable (LANTUS) 30 SubCutaneous at bedtime  insulin lispro (ADMELOG) corrective regimen sliding scale  SubCutaneous three times a day before meals  insulin lispro Injectable (ADMELOG) 10 SubCutaneous before breakfast  insulin lispro Injectable (ADMELOG) 10 SubCutaneous before lunch  insulin lispro Injectable (ADMELOG) 10 SubCutaneous before dinner  insulin regular Infusion 3 IV Continuous <Continuous>  lactated ringers. 1000 IV Continuous <Continuous>  pantoprazole    Tablet 40 Oral before breakfast      WEIGHT  Weight (kg): 85 (04-02-21 @ 23:30)  Creatinine, Serum: <0.5 mg/dL (04-07-21 @ 07:02)      ANTIBIOTICS:      All available historical records have been reviewed      
Patient is a 35y old  Female who presents with a chief complaint of DKA (06 Apr 2021 10:42)    Over Night Events:  No overnight events.  On 4L NC    ROS:   All ROS are negative except HPI     PHYSICAL EXAM  ICU Vital Signs Last 24 Hrs  T(C): 36.6 (07 Apr 2021 04:00), Max: 36.7 (06 Apr 2021 12:00)  T(F): 97.8 (07 Apr 2021 04:00), Max: 98.1 (06 Apr 2021 12:00)  HR: 89 (07 Apr 2021 09:00) (51 - 92)  BP: 101/83 (07 Apr 2021 08:00) (96/62 - 125/83)  BP(mean): 82 (07 Apr 2021 08:00) (73 - 90)  RR: 20 (07 Apr 2021 09:00) (20 - 20)  SpO2: 91% (07 Apr 2021 09:00) (91% - 98%)      · CONSTITUTIONAL:   not septic appearing,   well nourished,   NAD    · ENMT:   Airway patent,   Nasal mucosa clear.  Mouth with normal mucosa.   No thrush    · EYES:   Clear bilaterally,   pupils equal,   round and reactive to light.    · CARDIAC:   Normal rate,   Regular rhythm.    Heart sounds S1, S2.   No murmurs, no rubs or gallops on auscultation  no edema    · RESPIRATORY:   bilateral crackles  normal chest expansion  no retractions or use of accessory muscles  palpation of chest is normal with no fremitus  percussion of chest demonstrates no hyperresonance or dullness    · GASTROINTESTINAL:  Abdomen soft,   non-tender,   + BS    · MUSCULOSKELETAL:   no clubbing, cyanosis    · NEUROLOGICAL:   awake alert oriented  no obvious cranial nerve abnormalities    · SKIN:   Skin normal color for race,   warm, dry   No evidence of rash      04-06-21 @ 07:01  -  04-07-21 @ 07:00  --------------------------------------------------------  IN:    Lactated Ringers: 825 mL    Oral Fluid: 800 mL  Total IN: 1625 mL    OUT:    Voided (mL): 2400 mL  Total OUT: 2400 mL    Total NET: -775 mL      04-07-21 @ 07:01  -  04-07-21 @ 10:34  --------------------------------------------------------  IN:    Lactated Ringers: 150 mL  Total IN: 150 mL    OUT:  Total OUT: 0 mL    Total NET: 150 mL          LABS:                            12.2   5.91  )-----------( 320      ( 07 Apr 2021 07:02 )             37.7                                               04-07    138  |  102  |  14  ----------------------------<  153<H>  3.2<L>   |  28  |  <0.5<L>    Ca    7.9<L>      07 Apr 2021 07:02  Phos  3.4     04-06  Mg     1.9     04-06    TPro  5.4<L>  /  Alb  2.8<L>  /  TBili  0.3  /  DBili  x   /  AST  27  /  ALT  19  /  AlkPhos  89  04-07    PT/INR - ( 06 Apr 2021 06:49 )   PT: 13.40 sec;   INR: 1.17 ratio       LIVER FUNCTIONS - ( 07 Apr 2021 07:02 )  Alb: 2.8 g/dL / Pro: 5.4 g/dL / ALK PHOS: 89 U/L / ALT: 19 U/L / AST: 27 U/L / GGT: x                                              MEDICATIONS  (STANDING):  ALPRAZolam 0.25 milliGRAM(s) Oral daily  dexAMETHasone  Injectable 6 milliGRAM(s) IV Push every 24 hours  dextrose 40% Gel 15 Gram(s) Oral once  dextrose 5%. 1000 milliLiter(s) (50 mL/Hr) IV Continuous <Continuous>  dextrose 5%. 1000 milliLiter(s) (100 mL/Hr) IV Continuous <Continuous>  dextrose 50% Injectable 25 Gram(s) IV Push once  dextrose 50% Injectable 25 Gram(s) IV Push once  dextrose 50% Injectable 12.5 Gram(s) IV Push once  glucagon  Injectable 1 milliGRAM(s) IntraMuscular once  insulin glargine Injectable (LANTUS) 30 Unit(s) SubCutaneous at bedtime  insulin lispro (ADMELOG) corrective regimen sliding scale   SubCutaneous three times a day before meals  insulin lispro Injectable (ADMELOG) 10 Unit(s) SubCutaneous before breakfast  insulin lispro Injectable (ADMELOG) 10 Unit(s) SubCutaneous before lunch  insulin lispro Injectable (ADMELOG) 10 Unit(s) SubCutaneous before dinner  insulin regular Infusion 3 Unit(s)/Hr (3 mL/Hr) IV Continuous <Continuous>  lactated ringers. 1000 milliLiter(s) (75 mL/Hr) IV Continuous <Continuous>  pantoprazole    Tablet 40 milliGRAM(s) Oral before breakfast    MEDICATIONS  (PRN):  ondansetron Injectable 4 milliGRAM(s) IV Push every 8 hours PRN Nausea and/or Vomiting      New X-rays reviewed:                                                                                  ECHO    CXR interpreted by me:  bilateral opacifications

## 2021-04-08 NOTE — PROGRESS NOTE ADULT - ATTENDING COMMENTS
36 y/o F with no PMH now presenting to ED with 4 day h/o SOB and cough. In the ED pt was found to have DKA along with PNA on Xray. Pt was given IV insulin along with antibiotics. Admitted to MICU with acute hypoxemic respiratory failure, possible severe COVID PNA, and DKA.    #Acute Hypoxemic Respiratory Failure, improved  #Possible severe COVID PNA  - Now satting 92 on 1L   - Target Spo2 92-96%  - Suspicion for COVID PNA (negative PCR x3, positive Ab)  - S/P Toci 4/4  - C/w dexamethasone 6mg daily until d/c  - BCx NGTD  - titrate off O2 and then dc home     #DKA, resolved  - Monitor FS  - C/w basal/bolus insulin regimen + sliding scale  - Endocrinology following       #Misc  Diet: consistent carbs  DVT PPx: Lovenox    Dispo: dc home tomorrow if can titrate off O2       Juliana Avendano, DO

## 2021-04-08 NOTE — DIETITIAN INITIAL EVALUATION ADULT. - OTHER CALCULATIONS
est kcal needs = 1530-1680kcal/day (MSJ x1.0-1.1 d/t BMI >30); est protein needs = 68-85g/day (0.8-1.0 g/kg CBW); est fluid needs= 1mL/kcal or per LIP

## 2021-04-08 NOTE — PROGRESS NOTE ADULT - ASSESSMENT
36 y/o F with no PMH now presenting to ED with 4 day h/o SOB and cough. Pt went to urgent care to get rapid swab for COVID which came back negative. Now presented to ED with worsening symptoms. In the ED pt was found to have DKA along with PNA on Xray. Pt was given IV insulin along with antibiotics. Admitted to MICU with acute hypoxemic respiratory failure, possible severe COVID PNA, and DKA.    ASSESSMENT & PLAN:     #Acute Hypoxemic Respiratory Failure, improved  #Possible severe COVID PNA  - Now satting 91-96% on 4L N/C  - Target Spo2 92-96%  - Supplemental O2 as needed  - Suspicion for COVID PNA (negative PCR x3, positive Ab)  - Less likely bacterial superinfection  - S/P Toci 4/4  - As per ID, can stop antibiotics as procalcitonin remains negative  - C/w dexamethasone 6mg daily until d/c (day #6), as per ID  - Trend inflammatory markers Procal 0.05 (from 0.1) D-Dimer 94 (from 213) CRP 57 (from 182).  - BCx NGTD  - Encourage incentive spirometry  - Wean O2 as tolerated  - F/u with ID regarding dc isolation    #DKA, resolved  - Monitor FS  - C/w basal/bolus insulin regimen + sliding scale    #RBBB  - Cardio f/u    #Misc  Diet: consistent carbs  GI PPx: Protonix  DVT PPx: Lovenox  Dispo: downgrade to general medical floors

## 2021-04-08 NOTE — DIETITIAN INITIAL EVALUATION ADULT. - OTHER INFO
Pt p/w worsening SOB & cough x4d PTA. Hospital course complicated by DKA--now resolved, downgraded from vent unit 4/7; Hba1c 11.0% 4/2/21, new diagnosis, insulin regimen in place. Acute hypoxemic respiratory failure, possible COVID PNA--PCR negative x3, positive ab; tolerating 4L NC satting 91-96%; awaiting ID follow up.

## 2021-04-08 NOTE — PROGRESS NOTE ADULT - PROVIDER SPECIALTY LIST ADULT
Internal Medicine
Infectious Disease
Pulmonology
Critical Care
Critical Care
Endocrinology
Pulmonology
Pulmonology
Infectious Disease

## 2021-04-08 NOTE — DIETITIAN INITIAL EVALUATION ADULT. - ORAL INTAKE PTA/DIET HISTORY
Pt reports adequate appetite & intake at baseline. Follows 3meal/day pattern w. intermittent snacks. NKFA. Does not follow specific diet at baseline. Denies use of nutrition supplements, no cultural/Yazidism food preferences. nutrition education completed, see note at end of document.

## 2021-04-08 NOTE — DIETITIAN INITIAL EVALUATION ADULT. - PERSON TAUGHT/METHOD
Pt reports she is aware of "what Diabetics should & shouldn't eat" as she has many family members w. Diabetes. Reports she doesn't like bread at baseline & does not eat, will sometimes have English muffin or bagel instead. Discussed overview of CHO Consistent diet, simple vs. complex carbs, how to carb count & label reading. Pt actively participating in conversation but then goes off on tangents not related to topics at times. Discussed importance of portion control & eating balanced meals. Discussed options to choose at [facility] & obtained pt food preferences. Provided pt w. written packet as well. Will reassess need for further ed atf/u./verbal instruction/written material/patient instructed

## 2021-04-08 NOTE — DIETITIAN INITIAL EVALUATION ADULT. - ENERGY INTAKE
Fair (>50%) Pt reports fair PO intake >50% since admit but feels meals are not appropriate to DM diet that she is aware of. Diet education completed at bedside see note below.

## 2021-04-08 NOTE — DIETITIAN INITIAL EVALUATION ADULT. - FACTORS AFF FOOD INTAKE
c/o difficulty chewing dry/tough food d/t c/o dry mouth likely a/w supplemental O2. constipation noted w. no BM since 4/1

## 2021-04-09 VITALS — OXYGEN SATURATION: 92 %

## 2021-04-09 LAB
ALBUMIN SERPL ELPH-MCNC: 3.5 G/DL — SIGNIFICANT CHANGE UP (ref 3.5–5.2)
ALP SERPL-CCNC: 111 U/L — SIGNIFICANT CHANGE UP (ref 30–115)
ALT FLD-CCNC: 104 U/L — HIGH (ref 0–41)
ANION GAP SERPL CALC-SCNC: 9 MMOL/L — SIGNIFICANT CHANGE UP (ref 7–14)
AST SERPL-CCNC: 171 U/L — HIGH (ref 0–41)
BASOPHILS # BLD AUTO: 0.03 K/UL — SIGNIFICANT CHANGE UP (ref 0–0.2)
BASOPHILS NFR BLD AUTO: 0.5 % — SIGNIFICANT CHANGE UP (ref 0–1)
BILIRUB SERPL-MCNC: 0.4 MG/DL — SIGNIFICANT CHANGE UP (ref 0.2–1.2)
BUN SERPL-MCNC: 14 MG/DL — SIGNIFICANT CHANGE UP (ref 10–20)
CALCIUM SERPL-MCNC: 8.3 MG/DL — LOW (ref 8.5–10.1)
CHLORIDE SERPL-SCNC: 102 MMOL/L — SIGNIFICANT CHANGE UP (ref 98–110)
CO2 SERPL-SCNC: 29 MMOL/L — SIGNIFICANT CHANGE UP (ref 17–32)
CREAT SERPL-MCNC: 0.5 MG/DL — LOW (ref 0.7–1.5)
EOSINOPHIL # BLD AUTO: 0.09 K/UL — SIGNIFICANT CHANGE UP (ref 0–0.7)
EOSINOPHIL NFR BLD AUTO: 1.5 % — SIGNIFICANT CHANGE UP (ref 0–8)
GLUCOSE BLDC GLUCOMTR-MCNC: 115 MG/DL — HIGH (ref 70–99)
GLUCOSE BLDC GLUCOMTR-MCNC: 216 MG/DL — HIGH (ref 70–99)
GLUCOSE SERPL-MCNC: 133 MG/DL — HIGH (ref 70–99)
HCT VFR BLD CALC: 42.7 % — SIGNIFICANT CHANGE UP (ref 37–47)
HGB BLD-MCNC: 13.4 G/DL — SIGNIFICANT CHANGE UP (ref 12–16)
IMM GRANULOCYTES NFR BLD AUTO: 3.6 % — HIGH (ref 0.1–0.3)
LYMPHOCYTES # BLD AUTO: 1.18 K/UL — LOW (ref 1.2–3.4)
LYMPHOCYTES # BLD AUTO: 20.2 % — LOW (ref 20.5–51.1)
MAGNESIUM SERPL-MCNC: 2 MG/DL — SIGNIFICANT CHANGE UP (ref 1.8–2.4)
MCHC RBC-ENTMCNC: 23.9 PG — LOW (ref 27–31)
MCHC RBC-ENTMCNC: 31.4 G/DL — LOW (ref 32–37)
MCV RBC AUTO: 76.1 FL — LOW (ref 81–99)
MONOCYTES # BLD AUTO: 0.44 K/UL — SIGNIFICANT CHANGE UP (ref 0.1–0.6)
MONOCYTES NFR BLD AUTO: 7.5 % — SIGNIFICANT CHANGE UP (ref 1.7–9.3)
NEUTROPHILS # BLD AUTO: 3.9 K/UL — SIGNIFICANT CHANGE UP (ref 1.4–6.5)
NEUTROPHILS NFR BLD AUTO: 66.7 % — SIGNIFICANT CHANGE UP (ref 42.2–75.2)
NRBC # BLD: 0 /100 WBCS — SIGNIFICANT CHANGE UP (ref 0–0)
PHOSPHATE SERPL-MCNC: 3.4 MG/DL — SIGNIFICANT CHANGE UP (ref 2.1–4.9)
PLATELET # BLD AUTO: 359 K/UL — SIGNIFICANT CHANGE UP (ref 130–400)
POTASSIUM SERPL-MCNC: 3.6 MMOL/L — SIGNIFICANT CHANGE UP (ref 3.5–5)
POTASSIUM SERPL-SCNC: 3.6 MMOL/L — SIGNIFICANT CHANGE UP (ref 3.5–5)
PROT SERPL-MCNC: 6.1 G/DL — SIGNIFICANT CHANGE UP (ref 6–8)
RBC # BLD: 5.61 M/UL — HIGH (ref 4.2–5.4)
RBC # FLD: 14.1 % — SIGNIFICANT CHANGE UP (ref 11.5–14.5)
SODIUM SERPL-SCNC: 140 MMOL/L — SIGNIFICANT CHANGE UP (ref 135–146)
WBC # BLD: 5.85 K/UL — SIGNIFICANT CHANGE UP (ref 4.8–10.8)
WBC # FLD AUTO: 5.85 K/UL — SIGNIFICANT CHANGE UP (ref 4.8–10.8)

## 2021-04-09 PROCEDURE — 71045 X-RAY EXAM CHEST 1 VIEW: CPT | Mod: 26

## 2021-04-09 PROCEDURE — 99233 SBSQ HOSP IP/OBS HIGH 50: CPT

## 2021-04-09 RX ORDER — ISOPROPYL ALCOHOL, BENZOCAINE .7; .06 ML/ML; ML/ML
1 SWAB TOPICAL
Qty: 100 | Refills: 1
Start: 2021-04-09 | End: 2021-05-28

## 2021-04-09 RX ORDER — INSULIN GLARGINE 100 [IU]/ML
30 INJECTION, SOLUTION SUBCUTANEOUS
Qty: 900 | Refills: 0
Start: 2021-04-09 | End: 2021-05-08

## 2021-04-09 RX ORDER — METFORMIN HYDROCHLORIDE 850 MG/1
1 TABLET ORAL
Qty: 60 | Refills: 0
Start: 2021-04-09 | End: 2021-05-08

## 2021-04-09 RX ORDER — INSULIN GLARGINE 100 [IU]/ML
30 INJECTION, SOLUTION SUBCUTANEOUS
Qty: 1 | Refills: 0
Start: 2021-04-09 | End: 2021-05-08

## 2021-04-09 RX ADMIN — PANTOPRAZOLE SODIUM 40 MILLIGRAM(S): 20 TABLET, DELAYED RELEASE ORAL at 06:14

## 2021-04-09 RX ADMIN — Medication 6 MILLIGRAM(S): at 06:14

## 2021-04-09 RX ADMIN — Medication 10 UNIT(S): at 08:19

## 2021-04-09 RX ADMIN — Medication 10 UNIT(S): at 13:05

## 2021-04-09 RX ADMIN — ENOXAPARIN SODIUM 40 MILLIGRAM(S): 100 INJECTION SUBCUTANEOUS at 13:06

## 2021-04-09 RX ADMIN — Medication 2: at 13:05

## 2021-04-09 RX ADMIN — Medication 81 MILLIGRAM(S): at 13:06

## 2021-04-09 NOTE — DISCHARGE NOTE NURSING/CASE MANAGEMENT/SOCIAL WORK - NSDCFUADDAPPT_GEN_ALL_CORE_FT
Please call and make an appointment at the clinic to establish care with Dr. Gaytan (Primary Care).  Please call and make an appointment at the clinic to establish care with Dr. Orozco (Endocrinology).  Please call and make an appointment at the clinic to establish care with Dr. Plunkett (Podiatry).  Please call and make an appointment at the clinic to establish care with Dr. Espinal (Ophthalmology).

## 2021-04-09 NOTE — DISCHARGE NOTE NURSING/CASE MANAGEMENT/SOCIAL WORK - PATIENT PORTAL LINK FT
You can access the FollowMyHealth Patient Portal offered by [facility] by registering at the following website: [email or web address]. By joining [employer]’s FollowMyHealth portal, you will also be able to view your health information using other applications (apps) compatible with our system.

## 2021-04-09 NOTE — DISCHARGE NOTE PROVIDER - PROVIDER TOKENS
PROVIDER:[TOKEN:[[id number]],FOLLOWUP:[1 week]],PROVIDER:[TOKEN:[[id number]],FOLLOWUP:[2 weeks]],PROVIDER:[TOKEN:[[id number]],FOLLOWUP:[2 weeks]],PROVIDER:[TOKEN:[[id number]],FOLLOWUP:[1 week]]

## 2021-04-09 NOTE — DISCHARGE NOTE PROVIDER - CARE PROVIDERS DIRECT ADDRESSES
[email or web address]

## 2021-04-09 NOTE — DISCHARGE NOTE PROVIDER - NSDCCPCAREPLAN_GEN_ALL_CORE_FT
PRINCIPAL DISCHARGE DIAGNOSIS  Diagnosis: DKA (diabetic ketoacidoses)  Assessment and Plan of Treatment: You were found to have very elevated glucose which led to an acidotic state of the body requiring insulin drip and constant monitoring of the glucose. These episodes are triggered by either medication noncompliance, poor dietary compliance, or infection. Please follow up with your doctor and endocrinology for further management and monitoring.         PRINCIPAL DISCHARGE DIAGNOSIS  Diagnosis: DKA (diabetic ketoacidoses)  Assessment and Plan of Treatment: You were found to have very elevated glucose which led to an acidotic state of the body requiring insulin drip and constant monitoring of the glucose. These episodes are triggered by either medication noncompliance, poor dietary compliance, or infection. Please follow up with your doctor and endocrinology for further management and monitoring.  - start Insulin Glargine 30u at night, and Metformin twice daily   - Follow up with Endocrinology as an outpatient

## 2021-04-09 NOTE — DISCHARGE NOTE PROVIDER - HOSPITAL COURSE
36 y/o F with no PMH now presenting to ED with 4 day h/o SOB and cough. Pt went to urgent care to get rapid swab for COVID which came back negative. Now presented to ED with worsening symptoms. In the ED pt was found to have DKA along with PNA on Xray. Pt was given IV insulin along with antibiotics. Admitted to MICU with acute hypoxemic respiratory failure, possible severe COVID PNA, and DKA.  Was downgraded to the floor.  Covid PCR was negarive x3.  Inflammatory markers trended down.      ASSESSMENT & PLAN:     #Acute Hypoxemic Respiratory Failure, improved  #Possible severe COVID PNA  - Now satting 91-96% on 4L N/C  - Target Spo2 92-96%  - Supplemental O2 as needed  - Suspicion for COVID PNA (negative PCR x3, positive Ab)  - Less likely bacterial superinfection  - S/P Toci 4/4  - As per ID, can stop antibiotics as procalcitonin remains negative  - C/w dexamethasone 6mg daily until discharge as per ID  - Trend inflammatory markers  - Encourage incentive spirometry  - Aspiration precautions  - F/u cultures until final  - C/w Lovenox daily    #DKA, resolved  - Monitor FS  - C/w basal/bolus insulin regimen + sliding scale    #RBBB  - Cardio f/u    #Misc  Diet: consistent carbs  GI PPx: Protonix  DVT PPx: Lovenox  Dispo: Home   36 y/o F with no PMH now presenting to ED with 4 day h/o SOB and cough. Pt went to urgent care to get rapid swab for COVID which came back negative. Now presented to ED with worsening symptoms. In the ED pt was found to have DKA along with PNA on Xray. Pt was given IV insulin along with antibiotics. Admitted to MICU with acute hypoxemic respiratory failure, possible severe COVID PNA, and DKA.  Was downgraded to the floor.  Covid PCR was negarive x3.  Inflammatory markers trended down.      #Acute Hypoxemic Respiratory Failure, resolved  #Possible severe COVID PNA  s/p toci and dexamethasone   cleared by ID no need for isolation     #DKA, resolved  d/c w/ glargine and Metformin     #RBBB    Physical Exam:   General: No acute distress.    HEENT: Pupils equal, reactive to light symmetrically.  PULM: Clear to auscultation bilaterally, no significant sputum production.  CVS: Regular rate and rhythm, no murmurs, rubs, or gallops.  GI: Soft, nondistended, nontender, no masses.  MSK: No edema, nontender.  SKIN: Warm and well perfused, no rashes noted.  PSYCH: Alert, oriented, interactive, nonfocal.      Plan:   dc home

## 2021-04-09 NOTE — DISCHARGE NOTE PROVIDER - NSDCMRMEDTOKEN_GEN_ALL_CORE_FT
alcohol swabs : Apply topically to affected area 4 times a day   alcohol swabs : Apply topically to affected area 4 times a day   Basaglar KwikPen 100 units/mL subcutaneous solution: 30 unit(s) subcutaneous once a day (at bedtime)   glucometer (per patient&#x27;s insurance): Test blood sugars four times a day. Dispense #1 glucometer.  lancets: 1 application subcutaneously 4 times a day   metFORMIN 1000 mg oral tablet: 1 tab(s) orally 2 times a day   test strips (per patient&#x27;s insurance): 1 application subcutaneously 4 times a day. ** Compatible with patient&#x27;s glucometer **

## 2021-04-09 NOTE — DISCHARGE NOTE PROVIDER - CARE PROVIDER_API CALL
Venu Orozco  INTERNAL MEDICINE  [address] [place] [address]  Phone: [phone]  Fax: [phone]  Follow Up Time: 1 week    Joey Plunkett (DPM)  Podiatric Medicine  [address] [facility], [address] [place] [address]  Phone: [phone]  Fax: [phone]  Follow Up Time: 2 weeks    Anurag Espinal)  Ophthalmology  [address] [place] [address]  Phone: [phone]  Fax: [phone]  Follow Up Time: 2 weeks    Edi Gaytan (DO)  Medicine  Physicians  [address] [facility], [address] [place] [address]  Phone: [phone]  Fax: [phone]  Follow Up Time: 1 week

## 2021-04-11 LAB
CULTURE RESULTS: SIGNIFICANT CHANGE UP
SPECIMEN SOURCE: SIGNIFICANT CHANGE UP

## 2021-04-12 NOTE — CDI QUERY NOTE - NSCDIOTHERTXTBX_GEN_ALL_CORE_HH
Documentation:  ** 4/7 PN ID: Pneumonia, community acqured vs COVID pneumonia. RECOMMENDATIONS: s/p tocilizumab 4/4. Can stop antibiotics as procalcitonin remains negative  ** 4/8 PN Dr. Avendano:  Suspicion for COVID PNA (negative PCR x3, positive Ab). S/P Toci 4/4. C/w dexamethasone 6mg daily until d/c.  ** 4/9 Discharge Summary: presenting to ED with 4 day h/o SOB and cough. Possible severe COVID PNA. s/p toci and dexamethasone. Cleared by ID no need for isolation.     Vital:  ED: T 100.1, , RR 20    Lab:  4/1: COVID-19 PCR: negative  4/1: WBC: 6.53, Auto lymphocyte: 0.86  4/1: D-DImer: 173  4/2: SGOT: 55  4/2: Alkaline phosphatase: 161. This trended down with time 158 (4/3), 127 (4/4), 103 (4/6)  4/2: COVID-19 spike domain antibody: 30.60  4/3: Ferritin, serum: 315.     Orders:   4/3 - 4/9: Dexamethazone 6 mg IV / 24 Hrs.   4/4: Tocilizumab 600 mg IV stop after one dose. Indication: COVID-19                                                       Based on your clinical judgment and consideration of these clinical indicators, please clarify if possible severe COVID PNA can be further specified as:  • Severe COVID PNA despite of negative test results.	  • Other (please specify).  • Unable to determine.

## 2021-04-16 DIAGNOSIS — J96.01 ACUTE RESPIRATORY FAILURE WITH HYPOXIA: ICD-10-CM

## 2021-04-16 DIAGNOSIS — L90.6 STRIAE ATROPHICAE: ICD-10-CM

## 2021-04-16 DIAGNOSIS — I45.10 UNSPECIFIED RIGHT BUNDLE-BRANCH BLOCK: ICD-10-CM

## 2021-04-16 DIAGNOSIS — G43.909 MIGRAINE, UNSPECIFIED, NOT INTRACTABLE, WITHOUT STATUS MIGRAINOSUS: ICD-10-CM

## 2021-04-16 DIAGNOSIS — Z00.6 ENCOUNTER FOR EXAMINATION FOR NORMAL COMPARISON AND CONTROL IN CLINICAL RESEARCH PROGRAM: ICD-10-CM

## 2021-04-16 DIAGNOSIS — E11.10 TYPE 2 DIABETES MELLITUS WITH KETOACIDOSIS WITHOUT COMA: ICD-10-CM

## 2021-04-16 DIAGNOSIS — E66.9 OBESITY, UNSPECIFIED: ICD-10-CM

## 2021-04-16 DIAGNOSIS — U07.1 COVID-19: ICD-10-CM

## 2021-04-16 DIAGNOSIS — Z83.3 FAMILY HISTORY OF DIABETES MELLITUS: ICD-10-CM

## 2021-04-16 DIAGNOSIS — J12.82 PNEUMONIA DUE TO CORONAVIRUS DISEASE 2019: ICD-10-CM

## 2021-04-16 DIAGNOSIS — Z79.4 LONG TERM (CURRENT) USE OF INSULIN: ICD-10-CM

## 2021-04-16 DIAGNOSIS — N93.9 ABNORMAL UTERINE AND VAGINAL BLEEDING, UNSPECIFIED: ICD-10-CM

## 2021-11-05 NOTE — DIETITIAN INITIAL EVALUATION ADULT. - NUTRITION DIAGNOSITC TERMINOLOGY #1
Caller: Shital Guzmán    Relationship: Self    Best call back number: [phone]    What was the call regarding:   PATIENT WOULD LIKE A CALL BACK REGARDING HIS GLUCOSE LEVELS STAYING HIGH BEFORE AND AFTER EATING.     PATIENT STATED THAT THIS MORNING 11/05/2021 HER GLUCOSE LEVEL  AND SHE DID NOT EAT AND TOOK HER INSULIN SHOT AND WAITED A HOUR AND HER LEVELS WHERE 152.     PATIENT WOULD LIKE A CALL BACK REGARDING  Insulin Glargine (LANTUS SOLOSTAR) 100 UNIT/ML injection pen  GETTING INCREASED     Do you require a callback: YES        Food & Nutrition Related Knowledge Deficit

## 2022-05-12 NOTE — DISCHARGE NOTE PROVIDER - NSDCFUADDAPPT_GEN_ALL_CORE_FT
Please call and make an appointment at the clinic to establish care with Dr. Gaytan (Primary Care).  Please call and make an appointment at the clinic to establish care with Dr. Orozco (Endocrinology).  Please call and make an appointment at the clinic to establish care with Dr. Plunkett (Podiatry).  Please call and make an appointment at the clinic to establish care with Dr. Espinal (Ophthalmology).   ambulatory

## 2023-11-21 NOTE — ED PROVIDER NOTE - PRO INTERPRETER NEED 2
Group Therapy Note    Date: 11/21/2023    Group Start Time: 2336  Group End Time: 6581  Group Topic: [facility] Meeting    [address]         Group Therapy Note    Attendees: 8       Patient's Goal:  Pt stated that his goal for today is do more patient out therapy. Notes:      Status After Intervention:  Unchanged    Participation Level:  Active Listener    Participation Quality: Appropriate, Attentive, Sharing, and Supportive      Speech:  normal      Thought Process/Content: Logical      Affective Functioning: Congruent      Mood:  optimistic      Level of consciousness:  Alert      Response to Learning: Able to verbalize current knowledge/experience, Able to verbalize/acknowledge new learning, and Able to retain information      Endings: None Reported    Modes of Intervention: Education and Support      Discipline Responsible: [address]      Signature:  Thelda Kehr English

## 2024-06-16 ENCOUNTER — NON-APPOINTMENT (OUTPATIENT)
Age: 39
End: 2024-06-16

## 2024-07-31 ENCOUNTER — NON-APPOINTMENT (OUTPATIENT)
Age: 39
End: 2024-07-31

## 2024-11-21 ENCOUNTER — NON-APPOINTMENT (OUTPATIENT)
Age: 39
End: 2024-11-21

## 2025-01-03 ENCOUNTER — NON-APPOINTMENT (OUTPATIENT)
Age: 40
End: 2025-01-03

## 2025-02-04 ENCOUNTER — NON-APPOINTMENT (OUTPATIENT)
Age: 40
End: 2025-02-04

## 2025-03-13 ENCOUNTER — NON-APPOINTMENT (OUTPATIENT)
Age: 40
End: 2025-03-13

## 2025-03-28 ENCOUNTER — NON-APPOINTMENT (OUTPATIENT)
Age: 40
End: 2025-03-28

## 2025-06-05 ENCOUNTER — NON-APPOINTMENT (OUTPATIENT)
Age: 40
End: 2025-06-05

## 2025-08-15 ENCOUNTER — NON-APPOINTMENT (OUTPATIENT)
Age: 40
End: 2025-08-15